# Patient Record
Sex: MALE | Race: WHITE | NOT HISPANIC OR LATINO | Employment: FULL TIME | URBAN - METROPOLITAN AREA
[De-identification: names, ages, dates, MRNs, and addresses within clinical notes are randomized per-mention and may not be internally consistent; named-entity substitution may affect disease eponyms.]

---

## 2009-01-22 LAB — HCV AB SER-ACNC: NEGATIVE

## 2017-11-13 ENCOUNTER — OFFICE VISIT (OUTPATIENT)
Dept: URGENT CARE | Facility: CLINIC | Age: 42
End: 2017-11-13
Payer: COMMERCIAL

## 2017-11-13 PROCEDURE — G0382 LEV 3 HOSP TYPE B ED VISIT: HCPCS

## 2017-11-13 PROCEDURE — S9083 URGENT CARE CENTER GLOBAL: HCPCS

## 2017-11-14 NOTE — PROGRESS NOTES
Assessment    1  Acute bronchitis (466 0) (J20 9)    Plan  Acute bronchitis    · Azithromycin 250 MG Oral Tablet (Zithromax Z-Robert); TAKE 2 TABLETS ON DAY 1THEN TAKE 1 TABLET A DAY FOR 4 DAYS   · Proventil  (90 Base) MCG/ACT Inhalation Aerosol Solution; INHALE 1-2PUFFS EVERY 4-6 HOURS AS NEEDED AND AS DIRECTED    Discussion/Summary  Discussion Summary:   Take antibiotic as directed  (Z-Robert)  use Proventil inhaler as needed for wheezing  Increase fluids  Continue with Mucinex  Follow-up family doctor if symptoms persist or worsen over the next 3-4 days  Medication Side Effects Reviewed: Possible side effects of new medications were reviewed with the patient/guardian today  Understands and agrees with treatment plan: The treatment plan was reviewed with the patient/guardian  The patient/guardian understands and agrees with the treatment plan   Follow Up Instructions: Follow Up with your Primary Care Provider in 3-7-4 days  If your symptoms worsen, go to the nearest [place] [address] Emergency Department  Chief Complaint    1  Cold Symptoms  Chief Complaint Free Text Note Form: Patient relates started with cough, congestion, and sinus symptoms for 2 days  Denies fever  History of Present Illness  HPI: Patient presents today with complaints of cough and cold symptoms over the last 2 days  He states the cough is getting worse now productive for yellow brown mucus  He admits to feeling little tightness chest with coughing  He denies any associated fevers  He is a recovering heroin addict  He denies any history of asthma  He is a smoker but only smoking a few cigarettes a day at present  Hospital Based Practices Required Assessment:  Abuse And Domestic Violence Screen   Yes, the patient is safe at home  -- The patient states no one is hurting them  Depression And Suicide Screen  No, the patient has not had thoughts of hurting themself  No, the patient has not felt depressed in the past 7 days  Prefered Language is  english  Primary Language is  english  Review of Systems  Focused-Male:  Constitutional: no fever or chills, feels well, no tiredness, no recent weight loss or weight gain  ENT: no complaints of earache, no loss of hearing, no nosebleeds or nasal discharge, no sore throat or hoarseness  Cardiovascular: no complaints of slow or fast heart rate, no chest pain, no palpitations, no leg claudication or lower extremity edema  Respiratory: cough-- and-- wheezing  Gastrointestinal: no complaints of abdominal pain, no constipation, no nausea or vomiting, no diarrhea or bloody stools  Genitourinary: no complaints of dysuria or incontinence, no hesitancy, no nocturia, no genital lesion, no inadequacy of penile erection  Musculoskeletal: no complaints of arthralgia, no myalgia, no joint swelling or stiffness, no limb pain or swelling  Integumentary: no rashes  Active Problems  1  Acute sinusitis (461 9) (J01 90)   2  Cough (786 2) (R05)    Past Medical History  1  History of heroin use (V11 8) (Z86 59)  Active Problems And Past Medical History Reviewed: The active problems and past medical history were reviewed and updated today  Family History  Family History Reviewed: The family history was reviewed and updated today  Social History   · Current every day smoker (305 1) (F17 200)  Social History Reviewed: The social history was reviewed and updated today  Surgical History    1  Denied: History Of Prior Surgery  Surgical History Reviewed: The surgical history was reviewed and updated today  Current Meds   1  Methadone HCl - 10 MG/5ML Oral Solution; Therapy: (Recorded:[id number]) to Recorded  Medication List Reviewed: The medication list was reviewed and updated today  Allergies    1   No Known Drug Allergies    Vitals  Signs   Recorded: [id number] 06:30PM   Temperature: 99 6 F, Tympanic  Heart Rate: 89  Respiration: 18  Systolic: 715, LUE, Sitting  Diastolic: 82, LUE, Sitting  Height: 5 ft 6 in  Weight: 248 lb 6 oz  BMI Calculated: 40 09  BSA Calculated: 2 19  O2 Saturation: 97, RA  Pain Scale: 0    Physical Exam   Constitutional  General appearance: No acute distress, well appearing and well nourished  Eyes  Conjunctiva and lids: No swelling, erythema, or discharge  Pupils and irises: Equal, round and reactive to light  Ears, Nose, Mouth, and Throat  External inspection of ears and nose: Normal    Otoscopic examination: Tympanic membrance translucent with normal light reflex  Canals patent without erythema  Nasal mucosa, septum, and turbinates: Normal without edema or erythema  Oropharynx: Normal with no erythema, edema, exudate or lesions  Pulmonary  Respiratory effort: No increased work of breathing or signs of respiratory distress  Auscultation of lungs: Abnormal  -- Coarse breath sounds with reactive cough  End expiratory wheeze  No costal retractions  Cardiovascular  Palpation of heart: Normal PMI, no thrills  Auscultation of heart: Normal rate and rhythm, normal S1 and S2, without murmurs  Abdomen  Abdomen: Non-tender, no masses  Message  Return to work or school:   Marcia Dubin is under my professional care  He was seen in my office on 11/13/2017   He is able to return to work on  11/14/2007      ANCELMO Stafford        Signatures   Electronically signed by : Rose Marie Croft DO; Nov 13 2017  6:53PM EST                       (Author)

## 2018-01-18 NOTE — MISCELLANEOUS
Message  Return to work or school:   Trae Clark is under my professional care  He was seen in my office on 11/13/2017   He is able to return to work on  11/14/2007       ANCELMO Navarro        Signatures   Electronically signed by : Scottie Davis DO; Nov 13 2017  6:53PM EST                       (Author)

## 2018-02-21 ENCOUNTER — OFFICE VISIT (OUTPATIENT)
Dept: URGENT CARE | Facility: CLINIC | Age: 43
End: 2018-02-21
Payer: COMMERCIAL

## 2018-02-21 VITALS
WEIGHT: 250 LBS | RESPIRATION RATE: 20 BRPM | OXYGEN SATURATION: 96 % | HEART RATE: 98 BPM | BODY MASS INDEX: 40.18 KG/M2 | DIASTOLIC BLOOD PRESSURE: 70 MMHG | HEIGHT: 66 IN | SYSTOLIC BLOOD PRESSURE: 140 MMHG | TEMPERATURE: 98.1 F

## 2018-02-21 DIAGNOSIS — J01.90 ACUTE SINUSITIS, RECURRENCE NOT SPECIFIED, UNSPECIFIED LOCATION: Primary | ICD-10-CM

## 2018-02-21 PROCEDURE — 99203 OFFICE O/P NEW LOW 30 MIN: CPT | Performed by: FAMILY MEDICINE

## 2018-02-21 RX ORDER — FLUTICASONE PROPIONATE 50 MCG
2 SPRAY, SUSPENSION (ML) NASAL DAILY
Qty: 16 G | Refills: 0 | Status: SHIPPED | OUTPATIENT
Start: 2018-02-21 | End: 2018-05-01 | Stop reason: HOSPADM

## 2018-02-21 NOTE — LETTER
February 21, 2018     Patient: Jagdeep William   YOB: 1975   Date of Visit: 2/21/2018       To Whom It May Concern: It is my medical opinion that Jagdeep William may return to work on 02/22/2008  If you have any questions or concerns, please don't hesitate to call           Sincerely,        Iliana Cali DO    CC: No Recipients

## 2018-02-22 NOTE — PATIENT INSTRUCTIONS
Take Flonase nasal spray as directed  Mucinex over-the-counter as needed for symptoms  Follow with your family doctor in 3-4 days if symptoms persist or worsen

## 2018-05-01 ENCOUNTER — OFFICE VISIT (OUTPATIENT)
Dept: URGENT CARE | Facility: CLINIC | Age: 43
End: 2018-05-01

## 2018-05-01 VITALS
BODY MASS INDEX: 42.38 KG/M2 | HEART RATE: 110 BPM | DIASTOLIC BLOOD PRESSURE: 94 MMHG | TEMPERATURE: 97.5 F | OXYGEN SATURATION: 96 % | HEIGHT: 67 IN | WEIGHT: 270 LBS | SYSTOLIC BLOOD PRESSURE: 130 MMHG

## 2018-05-01 DIAGNOSIS — I87.2 VENOUS INSUFFICIENCY: Primary | ICD-10-CM

## 2018-05-01 PROCEDURE — 99203 OFFICE O/P NEW LOW 30 MIN: CPT | Performed by: PHYSICIAN ASSISTANT

## 2018-05-01 RX ORDER — METHADONE HYDROCHLORIDE 10 MG/1
TABLET ORAL EVERY 6 HOURS PRN
COMMUNITY

## 2018-05-01 NOTE — LETTER
May 1, 2018     Patient: Pilar Bosch   YOB: 1975   Date of Visit: 5/1/2018       To Whom It May Concern: It is my medical opinion that Pilar Bosch may return to work on 05/02/2018  If you have any questions or concerns, please don't hesitate to call           Sincerely,        Pat Arvizu PA-C    CC: No Recipients

## 2018-05-01 NOTE — LETTER
May 1, 2018     Patient: Silvio Garrett   YOB: 1975   Date of Visit: 5/1/2018       To Whom It May Concern: It is my medical opinion that Silvio Garrett may return to work on 05/03/2018  If you have any questions or concerns, please don't hesitate to call           Sincerely,        Sofie Raymond PA-C    CC: No Recipients

## 2018-05-12 NOTE — PROGRESS NOTES
[facility]        NAME: Paty Cazares is a 43 y o  male  : 1975    MRN: [id number]  DATE: May 12, 2018  TIME: 10:45 AM    Assessment and Plan   Venous insufficiency [I87 2]  1  Venous insufficiency       Patient Instructions     Compression socks and elevate feet when possible  Follow up with PCP in 3-5 days  Proceed to  ER if symptoms worsen  Chief Complaint     Chief Complaint   Patient presents with    Leg Swelling     x3 months symptoms worsening as today calves were bright red and bruning  History of Present Illness     35yo M presents for evaluation of worsening bilateral lower leg swelling and redness  Patient states he has had this for several months but it has greatly worsened in last 2 days after mowing grass in the heat  Patient states bilateral lower legs are painful and describes pain as an ache 5/10 in intensity  Patient denies hx of blood clots or strokes, chest pain, weakness, numbness, tingling  Review of Systems   Review of Systems   Constitutional: Negative for activity change, appetite change, chills, diaphoresis, fatigue, fever and unexpected weight change  Respiratory: Negative for apnea, cough, choking, chest tightness, shortness of breath, wheezing and stridor  Cardiovascular: Positive for leg swelling  Negative for chest pain and palpitations  Skin: Positive for color change and rash  Negative for pallor and wound  Current Medications       Current Outpatient Prescriptions:     methadone (DOLOPHINE) 10 mg tablet, Take by mouth every 6 (six) hours as needed for moderate pain,, Disp: , Rfl:     Current Allergies     Allergies as of 2018    (No Known Allergies)            The following portions of the patient's history were reviewed and updated as appropriate: allergies, current medications, past family history, past medical history, past social history, past surgical history and problem list      History reviewed   No pertinent past medical history  History reviewed  No pertinent surgical history  Family History   Problem Relation Age of Onset    Family history unknown: Yes         Medications have been verified  Objective   /94   Pulse (!) 110   Temp 97 5 °F (36 4 °C) (Tympanic)   Ht 5' 7" (1 702 m)   Wt 122 kg (270 lb)   SpO2 96%   BMI 42 29 kg/m²        Physical Exam     Physical Exam   Constitutional: He appears well-developed and well-nourished  Cardiovascular: Normal rate, regular rhythm, normal heart sounds and intact distal pulses  Exam reveals no gallop and no friction rub  No murmur heard  Pulmonary/Chest: Effort normal and breath sounds normal  No respiratory distress  He has no wheezes  He has no rales  He exhibits no tenderness  Abdominal: Soft  Bowel sounds are normal  He exhibits no distension and no mass  There is no tenderness  There is no rebound and no guarding     Skin:   Severe venous stasis rash of bilateral lower extremities below level of knee; severe varicose veins in bilateral legs; negative Kings's sign; no ecchymosis or warmth to touch of bilateral lower legs

## 2022-04-14 ENCOUNTER — APPOINTMENT (OUTPATIENT)
Dept: RADIOLOGY | Facility: CLINIC | Age: 47
End: 2022-04-14
Payer: COMMERCIAL

## 2022-04-14 ENCOUNTER — OFFICE VISIT (OUTPATIENT)
Dept: URGENT CARE | Facility: CLINIC | Age: 47
End: 2022-04-14
Payer: COMMERCIAL

## 2022-04-14 VITALS
HEIGHT: 67 IN | RESPIRATION RATE: 18 BRPM | OXYGEN SATURATION: 100 % | BODY MASS INDEX: 40.81 KG/M2 | DIASTOLIC BLOOD PRESSURE: 83 MMHG | SYSTOLIC BLOOD PRESSURE: 135 MMHG | TEMPERATURE: 98.7 F | WEIGHT: 260 LBS | HEART RATE: 92 BPM

## 2022-04-14 DIAGNOSIS — R06.00 DYSPNEA ON EXERTION: ICD-10-CM

## 2022-04-14 DIAGNOSIS — R10.84 GENERALIZED ABDOMINAL PAIN: Primary | ICD-10-CM

## 2022-04-14 DIAGNOSIS — R42 DIZZINESS: ICD-10-CM

## 2022-04-14 DIAGNOSIS — K02.9 DENTAL CARIES: ICD-10-CM

## 2022-04-14 LAB — GLUCOSE SERPL-MCNC: 125 MG/DL (ref 65–140)

## 2022-04-14 PROCEDURE — 71046 X-RAY EXAM CHEST 2 VIEWS: CPT

## 2022-04-14 PROCEDURE — 99213 OFFICE O/P EST LOW 20 MIN: CPT | Performed by: PHYSICIAN ASSISTANT

## 2022-04-14 PROCEDURE — 82948 REAGENT STRIP/BLOOD GLUCOSE: CPT | Performed by: PHYSICIAN ASSISTANT

## 2022-04-14 NOTE — PATIENT INSTRUCTIONS
Consider over-the-counter reflux medications  Drink plenty of fluids  Make appoint with family doctor and the dentist   Lisa Cota direct to the ER began having increased dizziness, change in vision, slurred speech, confusion, unilateral weakness, chest pain, shortness of breath, or severe abdominal pain  GERD (Gastroesophageal Reflux Disease)   WHAT YOU NEED TO KNOW:   Gastroesophageal reflux disease (GERD) is reflux that happens more than 2 times a week for a few weeks  Reflux means acid and food in your stomach back up into your esophagus  GERD can cause other health problems over time if it is not treated  DISCHARGE INSTRUCTIONS:   Call your local emergency number (911 in the [address]) if:   · You have severe chest pain and sudden trouble breathing  Return to the emergency department if:   · You have trouble breathing after you vomit  · You have trouble swallowing, or pain with swallowing  · Your bowel movements are black, bloody, or tarry-looking  · Your vomit looks like coffee grounds or has blood in it  Call your doctor or gastroenterologist if:   · You feel full and cannot burp or vomit  · You vomit large amounts, or you vomit often  · You are losing weight without trying  · Your symptoms get worse or do not improve with treatment  · You have questions or concerns about your condition or care  Medicines:   · Medicines  are used to decrease stomach acid  Medicine may also be used to help your lower esophageal sphincter and stomach contract (tighten) more  · Take your medicine as directed  Contact your healthcare provider if you think your medicine is not helping or if you have side effects  Tell him of her if you are allergic to any medicine  Keep a list of the medicines, vitamins, and herbs you take  Include the amounts, and when and why you take them  Bring the list or the pill bottles to follow-up visits  Carry your medicine list with you in case of an emergency      Manage GERD:       · Do not have foods or drinks that may increase heartburn  These include chocolate, peppermint, fried or fatty foods, drinks that contain caffeine, or carbonated drinks (soda)  Other foods include spicy foods, onions, tomatoes, and tomato-based foods  Do not have foods or drinks that can irritate your esophagus, such as citrus fruits, juices, and alcohol  · Do not eat large meals  When you eat a lot of food at one time, your stomach needs more acid to digest it  Eat 6 small meals each day instead of 3 large ones, and eat slowly  Do not eat meals 2 to 3 hours before bedtime  · Elevate the head of your bed  Place 6-inch blocks under the head of your bed frame  You may also use more than one pillow under your head and shoulders while you sleep  · Maintain a healthy weight  If you are overweight, weight loss may help relieve symptoms of GERD  · Do not smoke  Smoking weakens the lower esophageal sphincter and increases the risk of GERD  Ask your healthcare provider for information if you currently smoke and need help to quit  E-cigarettes or smokeless tobacco still contain nicotine  Talk to your healthcare provider before you use these products  · Do not put pressure on your abdomen  Pressure pushes acid up into your esophagus  Do not wear clothing that is tight around your waist  Do not bend over  Bend at the knees if you need to pick something up  Follow up with your doctor or gastroenterologist as directed:  Write down your questions so you remember to ask them during your visits  © [employer] 2022 Information is for End User's use only and may not be sold, redistributed or otherwise used for commercial purposes  All illustrations and images included in CareNotes® are the copyrighted property of [employer]  or [facility] Angelica Pereira   The above information is an  only  It is not intended as medical advice for individual conditions or treatments   Talk to your doctor, nurse or pharmacist before following any medical regimen to see if it is safe and effective for you  Abdominal Pain   WHAT YOU NEED TO KNOW:   Abdominal pain can be dull, achy, or sharp  You may have pain in one area of your abdomen, or in your entire abdomen  Your pain may be caused by a condition such as constipation, food sensitivity or poisoning, infection, or a blockage  Abdominal pain can also be from a hernia, appendicitis, or an ulcer  Liver, gallbladder, or kidney conditions can also cause abdominal pain  The cause of your abdominal pain may not be known  DISCHARGE INSTRUCTIONS:   Call your local emergency number (911 in the [address]) if:   · You have new chest pain or shortness of breath  Return to the emergency department if:   · You have pulsing pain in your upper abdomen or lower back that suddenly becomes constant  · Your pain is in the right lower abdominal area and worsens with movement  · You have a fever over 100 4°F (38°C) or shaking chills  · You are vomiting and cannot keep food or liquids down  · Your pain does not improve or gets worse over the next 8 to 12 hours  · You see blood in your vomit or bowel movements, or they look black and tarry  · Your skin or the whites of your eyes turn yellow  · You are a woman and have a large amount of vaginal bleeding that is not your monthly period  Call your doctor if:   · You have pain in your lower back  · You are a man and have pain in your testicles  · You have pain when you urinate  · You have questions or concerns about your condition or care  Medicines:   · Prescription pain medicine  may be given  Ask your healthcare provider how to take this medicine safely  Some prescription pain medicines contain acetaminophen  Do not take other medicines that contain acetaminophen without talking to your healthcare provider  Too much acetaminophen may cause liver damage   Prescription pain medicine may cause constipation  Ask your healthcare provider how to prevent or treat constipation  · Medicines  may be given to calm your stomach or prevent vomiting  · Take your medicine as directed  Contact your healthcare provider if you think your medicine is not helping or if you have side effects  Tell him of her if you are allergic to any medicine  Keep a list of the medicines, vitamins, and herbs you take  Include the amounts, and when and why you take them  Bring the list or the pill bottles to follow-up visits  Carry your medicine list with you in case of an emergency  Manage your symptoms:   · Apply heat  on your abdomen for 20 to 30 minutes every 2 hours for as many days as directed  Heat helps decrease pain and muscle spasms  · Make changes to the food you eat, if needed  Do not eat foods that cause abdominal pain or other symptoms  Eat small meals more often  The following changes may also help:    ? Eat more high-fiber foods if you are constipated  High-fiber foods include fruits, vegetables, whole-grain foods, and legumes  ? Do not eat foods that cause gas if you have bloating  Examples include broccoli, cabbage, and cauliflower  Do not drink soda or carbonated drinks  These may also cause gas  ? Do not eat foods or drinks that contain sorbitol or fructose if you have diarrhea and bloating  Some examples are fruit juices, candy, jelly, and sugar-free gum  ? Do not eat high-fat foods  Examples include fried foods, cheeseburgers, hot dogs, and desserts  ? Limit or do not have caffeine  Caffeine may make symptoms such as heartburn or nausea worse  ? Drink more liquids to prevent dehydration from diarrhea or vomiting  Ask your healthcare provider how much liquid to drink each day and which liquids are best for you  · Keep a diary of your abdominal pain  A diary may help your healthcare provider learn what is causing your abdominal pain   Include when the pain happens, how long it lasts, and what the pain feels like  Write down any other symptoms you have with abdominal pain  Also write down what you eat, and what symptoms you have after you eat  · Manage your stress  Stress may cause abdominal pain  Your healthcare provider may recommend relaxation techniques and deep breathing exercises to help decrease your stress  Your healthcare provider may recommend you talk to someone about your stress or anxiety, such as a counselor or a trusted friend  Get plenty of sleep and exercise regularly  · Limit or do not drink alcohol  Alcohol can make your abdominal pain worse  Ask your healthcare provider if it is safe for you to drink alcohol  Also ask how much is safe for you to drink  · Do not smoke  Nicotine and other chemicals in cigarettes can damage your esophagus and stomach  Ask your healthcare provider for information if you currently smoke and need help to quit  E-cigarettes or smokeless tobacco still contain nicotine  Talk to your healthcare provider before you use these products  Follow up with your doctor within 24 hours or as directed:  Write down your questions so you remember to ask them during your visits  © Copyright [employer] 2022 Information is for End User's use only and may not be sold, redistributed or otherwise used for commercial purposes  All illustrations and images included in CareNotes® are the copyrighted property of [employer]  or [address] [place] [address]  The above information is an  only  It is not intended as medical advice for individual conditions or treatments  Talk to your doctor, nurse or pharmacist before following any medical regimen to see if it is safe and effective for you  Dizziness   WHAT YOU NEED TO KNOW:   Dizziness is a feeling of being off balance or unsteady  Common causes of dizziness are an inner ear fluid imbalance or a lack of oxygen in your blood   Dizziness may be acute (lasts 3 days or less) or chronic (lasts longer than 3 days)  You may have dizzy spells that last from seconds to a few hours  DISCHARGE INSTRUCTIONS:   Return to the emergency department if:   · You have a headache and a stiff neck  · You have shaking chills and a fever  · You vomit over and over with no relief  · Your vomit or bowel movements are red or black  · You have pain in your chest, back, or abdomen  · You have numbness, especially in your face, arms, or legs  · You have trouble moving your arms or legs  · You are confused  Contact your healthcare provider if:   · You have a fever  · Your symptoms do not get better with treatment  · You have questions or concerns about your condition or care  Manage your symptoms:   · Do not drive  or operate heavy machinery when you are dizzy  · Get up slowly  from sitting or lying down  · Drink plenty of liquids  Liquids help prevent dehydration  Ask how much liquid to drink each day and which liquids are best for you  Follow up with your doctor as directed:  Write down your questions so you remember to ask them during your visits  © Copyright [employer] 2022 Information is for End User's use only and may not be sold, redistributed or otherwise used for commercial purposes  All illustrations and images included in CareNotes® are the copyrighted property of A [employer] A M [employer] Inc  or [facility] Angelica Pereira   The above information is an  only  It is not intended as medical advice for individual conditions or treatments  Talk to your doctor, nurse or pharmacist before following any medical regimen to see if it is safe and effective for you

## 2022-04-14 NOTE — LETTER
April 14, 2022     Patient: Tarik Madrid   YOB: 1975   Date of Visit: 4/14/2022       To Whom It May Concern: It is my medical opinion that Tarik Madrid should remain out of work until 4/18/2022           Sincerely,        Bradley Menon PA-C

## 2022-04-14 NOTE — PROGRESS NOTES
[facility]        NAME: Diana Springer is a 55 y o  male  : 1975    MRN: [id number]  DATE: 2022  TIME: 10:18 AM    Assessment and Plan   Generalized abdominal pain [R10 84]  1  Generalized abdominal pain  Ambulatory referral to [facility]   2  Dizziness  Fingerstick Glucose (POCT)   3  Dyspnea on exertion  XR chest pa & lateral   4  Dental caries  Ambulatory Referral to Dentistry     Suspect abdominal pain and upset stomach is possible reflux  Advised trial of over-the-counter reflux medications  However, history of fatty liver  Patient needs additional evaluation workup by family doctor  Patient given referral for Family Practice next door and local dentists  Patient Instructions   Consider over-the-counter reflux medications  Drink plenty of fluids  Make appoint with family doctor and the dentist   Lexy Castorena direct to the ER began having increased dizziness, change in vision, slurred speech, confusion, unilateral weakness, chest pain, shortness of breath, or severe abdominal pain  Follow up with PCP in 3-5 days  Proceed to  ER if symptoms worsen  Chief Complaint     Chief Complaint   Patient presents with    Abdominal Pain     Going on about a month with center belly pain and nausea feeling and having some dizziness  that started last night at work          History of Present Illness       Patient is a 40-year-old male with significant 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 presents the office complaining of multiple complaints  Patient reports he has had generalized abdominal cramping for 1 month  States eating appears to make it worse  Initially thought his symptoms was from a poor diet but has changed his diet and continues to have symptoms  Admits he also has symptoms even when he is not eating but thought it was just because he was hungry pains however, pain would continue even after he ate   When pain occurs, it is described as 5/10 "upset stomach"  Patient states he has generalized abdominal discomfort and bloating at this time  No change in bowel movements  History of fatty liver  States he constantly has a bad taste in his mouth denies throwing up in his mouth or belching  Admits he has very poor dentition and needs many teeth pulled  No current current dental pain  He went to the dentist 6 months ago for infection  Yesterday patient began having some brief dizzy spells which lasts a few seconds  States he feels lightheaded but denies syncopal episodes, floaters, chest pain, slurred speech, unilateral weakness, or confusion  Dizziness occurs both at rest and on exertion  Denies any recent illness or head trauma  He also notes having some dyspnea on exertion with associated palpitations described as heart racing over last few days  States he is generally active as he works in a [employer]  Patient has not seen a family doctor in 11 years  Review of Systems   Review of Systems   Constitutional: Positive for fatigue  Negative for chills and fever  HENT: Negative for congestion, postnasal drip, rhinorrhea and sore throat  Eyes: Negative for visual disturbance  Respiratory: Positive for shortness of breath  Negative for cough and wheezing  Cardiovascular: Positive for palpitations  Negative for chest pain and leg swelling  Gastrointestinal: Negative for abdominal pain, diarrhea, nausea and vomiting  Genitourinary: Negative for frequency  Musculoskeletal: Negative for myalgias  Skin: Negative for rash  Neurological: Positive for dizziness and light-headedness  Negative for syncope, facial asymmetry, weakness, numbness and headaches           Current Medications       Current Outpatient Medications:     methadone (DOLOPHINE) 10 mg tablet, Take by mouth every 6 (six) hours as needed for moderate pain, (Patient not taking: Reported on 4/14/2022, ), Disp: , Rfl:     Current Allergies     Allergies as of 04/14/2022    (No Known Allergies)            The following portions of the patient's history were reviewed and updated as appropriate: allergies, current medications, past family history, past medical history, past social history, past surgical history and problem list      Past Medical History:   Diagnosis Date    No known problems        Past Surgical History:   Procedure Laterality Date    NO PAST SURGERIES         Family History   Problem Relation Age of Onset    Diabetes Mother     Heart attack Father          Medications have been verified  Objective   /83   Pulse 92   Temp 98 7 °F (37 1 °C)   Resp 18   Ht 5' 7" (1 702 m)   Wt 118 kg (260 lb)   SpO2 100%   BMI 40 72 kg/m²   No LMP for male patient  Physical Exam     Physical Exam  Vitals and nursing note reviewed  Constitutional:       Appearance: Normal appearance  He is well-developed  He is obese  He is not ill-appearing  HENT:      Head: Normocephalic and atraumatic  Right Ear: Tympanic membrane, ear canal and external ear normal       Left Ear: Tympanic membrane, ear canal and external ear normal       Nose: Nose normal       Mouth/Throat:      Dentition: Abnormal dentition  Gingival swelling and dental caries present  No dental abscesses  Pharynx: Uvula midline  Comments: Diffuse poor oral with multiple caries and missing teeth  Eyes:      General: Lids are normal       Extraocular Movements: Extraocular movements intact  Conjunctiva/sclera: Conjunctivae normal       Pupils: Pupils are equal, round, and reactive to light  Cardiovascular:      Rate and Rhythm: Normal rate and regular rhythm  Heart sounds: Normal heart sounds  No murmur heard  No friction rub  No gallop  Pulmonary:      Effort: Pulmonary effort is normal       Breath sounds: Normal breath sounds  No stridor  No wheezing or rales     Abdominal:      General: Bowel sounds are normal  Palpations: Abdomen is soft  Tenderness: There is no abdominal tenderness  There is no guarding or rebound  Musculoskeletal:         General: Normal range of motion  Cervical back: Neck supple  Skin:     General: Skin is warm and dry  Capillary Refill: Capillary refill takes less than 2 seconds  Comments: 1+ LE pitting edema b/l   Neurological:      General: No focal deficit present  Mental Status: He is alert  GCS: GCS eye subscore is 4  GCS verbal subscore is 5  GCS motor subscore is 6  Cranial Nerves: Cranial nerves are intact  Sensory: Sensation is intact  Motor: Motor function is intact  Coordination: Coordination is intact  Gait: Gait is intact  Chest x-ray:  No evidence of acute cardiopulmonary etiology  Radiology interpretation pending  Point of care glucose: 125   Not fasting

## 2022-05-16 ENCOUNTER — OFFICE VISIT (OUTPATIENT)
Dept: URGENT CARE | Facility: CLINIC | Age: 47
End: 2022-05-16
Payer: COMMERCIAL

## 2022-05-16 VITALS
TEMPERATURE: 97 F | WEIGHT: 280 LBS | OXYGEN SATURATION: 96 % | HEIGHT: 67 IN | BODY MASS INDEX: 43.95 KG/M2 | HEART RATE: 86 BPM | RESPIRATION RATE: 18 BRPM

## 2022-05-16 DIAGNOSIS — R68.89 FLU-LIKE SYMPTOMS: Primary | ICD-10-CM

## 2022-05-16 PROCEDURE — 99213 OFFICE O/P EST LOW 20 MIN: CPT | Performed by: EMERGENCY MEDICINE

## 2022-05-16 PROCEDURE — U0005 INFEC AGEN DETEC AMPLI PROBE: HCPCS | Performed by: EMERGENCY MEDICINE

## 2022-05-16 PROCEDURE — U0003 INFECTIOUS AGENT DETECTION BY NUCLEIC ACID (DNA OR RNA); SEVERE ACUTE RESPIRATORY SYNDROME CORONAVIRUS 2 (SARS-COV-2) (CORONAVIRUS DISEASE [COVID-19]), AMPLIFIED PROBE TECHNIQUE, MAKING USE OF HIGH THROUGHPUT TECHNOLOGIES AS DESCRIBED BY CMS-2020-01-R: HCPCS | Performed by: EMERGENCY MEDICINE

## 2022-05-16 NOTE — PATIENT INSTRUCTIONS
You have been diagnosed with a flu-like illness, and your symptoms should resolve over the next 7 to 10 days with the treatments recommended today  If they do not, it is possible that you have developed a bacterial infection and you should return  If you were to take an antibiotic while you are still in the viral stage, you will not get better any faster, but could kill off good germs in your body as well as make germs resistant to the antibiotic  Take an expectorant - guaifenesin should be the only ingredient - during the day, and the cough suppressant (ex  Robitussin DM or Tessalon) if needed at night only  Take Zinc 50 mg every 12 hours for the next week  You should also take Quercetin 500 mg twice daily with it  You should also take vitamin D3 5000 i u s per day for the next 1 week, and vitamin-C 1 g daily  May take Flonase as discussed  You may also take a decongestant like Sudafed, unless you have hypertension or cardiac disease  You may take Imodium for diarrhea according to package instructions  Today you were tested for COVID-19 and influenza  Results will return approximately 5 days  The fastest way to receive results is to download the [facility] MyChart tyrone on your phone or great account on a computer  If you view your results on [employer] we will not call you  If you do not see results on [employer] we will call you if your positive or negative  WE CANNOT PRINT YOUR TEST RESULTS FROM THE URGENT CARE  This is against a law called HIPAA  You may print results from your [employer] (IT help [phone] option 5) or call medical records at [phone]  Prophylactically self quarantine  Department of health's newest recommendations as of December 27,2021 state the following:     IF you TEST POSITIVE for COVID-19  -stay home for 5 days  If you have no symptoms or your symptoms are resolving after 5 days, you can leave your house   Continue to wear a mask around others for 5 additional days  If you have a fever, continue to stay home until you fever resolves  IF YOU WERE EXPOSED TO SOMEONE WITH COVID 19  -if you have been boosted OR completed the primary series of Pfizer or Moderna vaccine within the last 6 months OR completed the primary series of J&J vaccine within the last 2 months, wear a mask around others for 10 days  Get tested on day 5 if possible  If you develop symptoms, get a test and stay home      -if you completed the primary series of Pfizer or Moderna vaccine over 6 months ago and are NOT boosted OR completed the primary series of J&J over 2 months ago and are NOT boosted OR are unvaccinated, stay home for 5 days  After that continue to wear a mask around others for 5 additional days  If you can not quarantine you must wear a mask for 10 days  Test on day 5 if possible  If you develops symptoms get a test and stay home  Drink lots of fluids to maintain hydration  Do not touch your face, wash hands often, and practice social distancing  There is no treatment for simple outpatient COVID-19 patients however, CDC recommends 2000 units vitamin D3 to boost the immune system  Those with severe illness, older age, or multiple comorbidities may qualify for monoclonal antibody infusions as treatment  Please call your doctor to see if you qualify  Call your family doctor to have a follow-up appointment in next few days  Go to ER if he began experiencing chest pain, shortness of breath, fever that is not responding to antipyretics or other severe symptoms  Follow up with PCP in 3-5 days  Proceed to ER if symptoms worsen  Flu-like illness   AMBULATORY CARE:   Flu-like illness is an infection caused by a virus  The flu is easily spread when an infected person coughs, sneezes, or has close contact with others  You may be able to spread the flu to others for 1 week or longer after signs or symptoms appear     Common signs and symptoms include the following:   Fever and chills    Headaches, body aches, and muscle or joint pain    Cough, runny nose, and sore throat    Loss of appetite, nausea, vomiting, or diarrhea    Tiredness    Trouble breathing  Call 911 for any of the following: You have trouble breathing, and your lips look purple or blue  You have a seizure  Seek care immediately if:   You are dizzy, or you are urinating less or not at all  You have a headache with a stiff neck, and you feel tired or confused  You have new pain or pressure in your chest     Your symptoms, such as shortness of breath, vomiting, or diarrhea, get worse  Your symptoms, such as fever and coughing, seem to get better, but then get worse  Contact your healthcare provider if:   You have new muscle pain or weakness  You have questions or concerns about your condition or care  Treatment for influenza  may include any of the following:  Acetaminophen decreases pain and fever  It is available without a doctor's order  Ask how much to take and how often to take it  Follow directions  Acetaminophen can cause liver damage if not taken correctly  NSAIDs  such as ibuprofen, help decrease swelling, pain, and fever  This medicine is available with or without a doctor's order  NSAIDs can cause stomach bleeding or kidney problems in certain people  If you take blood thinner medicine, always ask your healthcare provider if NSAIDs are safe for you  Always read the medicine label and follow directions  Antivirals  help fight a viral infection  Manage your symptoms:   Rest  as much as you can to help you recover  Drink liquids as directed  to help prevent dehydration  Ask how much liquid to drink each day and which liquids are best for you  Prevent the spread of the flu: Wash your hands often  Use soap and water  Wash your hands after you use the bathroom, change a child's diapers, or sneeze  Wash your hands before you prepare or eat food   Use gel hand cleanser when soap and water are not available  Do not touch your eyes, nose, or mouth unless you have washed your hands first        Cover your mouth when you sneeze or cough  Cough into a tissue or the bend of your arm  Clean shared items with a germ-killing   Clean table surfaces, doorknobs, and light switches  Do not share towels, silverware, and dishes with people who are sick  Wash bed sheets, towels, silverware, and dishes with soap and water  Wear a mask  over your mouth and nose if you are sick or are near anyone who is sick  Stay away from others  if you are sick  Influenza vaccine  helps prevent influenza (flu)  Everyone older than 6 months should get a yearly influenza vaccine  Get the vaccine as soon as it is available, usually in September or October each year  Follow up with your healthcare provider as directed:  Write down your questions so you remember to ask them during your visits  © 2017 [address] Information is for End User's use only and may not be sold, redistributed or otherwise used for commercial purposes  All illustrations and images included in CareNotes® are the copyrighted property of [employer]  or Pepe Dutton  The above information is an  only  It is not intended as medical advice for individual conditions or treatments  Talk to your doctor, nurse or pharmacist before following any medical regimen to see if it is safe and effective for you  [address]     Your healthcare provider and/or public health staff have evaluated you and have determined that you do not need to be hospitalized at this time  At this time you can be isolated at home where you will be monitored by staff from your local or state health department  You should carefully follow the prevention and isolation steps below until a healthcare provider or local or state health department says that you can return to your normal activities  Stay home except to get medical care     People who are mildly ill with COVID-19 are able to isolate at home during their illness  You should restrict activities outside your home, except for getting medical care  Do not go to work, school, or public areas  Avoid using public transportation, ride-sharing, or taxis  Separate yourself from other people and animals in your home     People: As much as possible, you should stay in a specific room and away from other people in your home  Also, you should use a separate bathroom, if available  Animals: You should restrict contact with pets and other animals while you are sick with COVID-19, just like you would around other people  Although there have not been reports of pets or other animals becoming sick with COVID-19, it is still recommended that people sick with COVID-19 limit contact with animals until more information is known about the virus  When possible, have another member of your household care for your animals while you are sick  If you are sick with COVID-19, avoid contact with your pet, including petting, snuggling, being kissed or licked, and sharing food  If you must care for your pet or be around animals while you are sick, wash your hands before and after you interact with pets and wear a facemask  See COVID-19 and Animals for more information  Call ahead before visiting your doctor     If you have a medical appointment, call the healthcare provider and tell them that you have or may have COVID-19  This will help the healthcare provider's office take steps to keep other people from getting infected or exposed  Wear a facemask     You should wear a facemask when you are around other people (e g , sharing a room or vehicle) or pets and before you enter a healthcare provider's office   If you are not able to wear a facemask (for example, because it causes trouble breathing), then people who live with you should not stay in the same room with you, or they should wear a facemask if they enter your room  Cover your coughs and sneezes     Cover your mouth and nose with a tissue when you cough or sneeze  Throw used tissues in a lined trash can  Immediately wash your hands with soap and water for at least 20 seconds or, if soap and water are not available, clean your hands with an alcohol-based hand  that contains at least 60% alcohol  Clean your hands often     Wash your hands often with soap and water for at least 20 seconds, especially after blowing your nose, coughing, or sneezing; going to the bathroom; and before eating or preparing food  If soap and water are not readily available, use an alcohol-based hand  with at least 60% alcohol, covering all surfaces of your hands and rubbing them together until they feel dry  Soap and water are the best option if hands are visibly dirty  Avoid touching your eyes, nose, and mouth with unwashed hands  Avoid sharing personal household items     You should not share dishes, drinking glasses, cups, eating utensils, towels, or bedding with other people or pets in your home  After using these items, they should be washed thoroughly with soap and water  Clean all high-touch surfaces everyday     High touch surfaces include counters, tabletops, doorknobs, bathroom fixtures, toilets, phones, keyboards, tablets, and bedside tables  Also, clean any surfaces that may have blood, stool, or body fluids on them  Use a household cleaning spray or wipe, according to the label instructions  Labels contain instructions for safe and effective use of the cleaning product including precautions you should take when applying the product, such as wearing gloves and making sure you have good ventilation during use of the product  Monitor your symptoms     Seek prompt medical attention if your illness is worsening (e g , difficulty breathing)   Before seeking care, call your healthcare provider and tell them that you have, or are being evaluated for, COVID-19  Put on a facemask before you enter the facility  These steps will help the healthcare provider's office to keep other people in the office or waiting room from getting infected or exposed  Ask your healthcare provider to call the local or state health department  Persons who are placed under active monitoring or facilitated self-monitoring should follow instructions provided by their local health department or occupational health professionals, as appropriate  If you have a medical emergency and need to call 911, notify the dispatch personnel that you have, or are being evaluated for COVID-19  If possible, put on a facemask before emergency medical services arrive  Discontinuing home isolation     Patients with confirmed COVID-19 should remain under home isolation precautions until the risk of secondary transmission to others is thought to be low  The decision to discontinue home isolation precautions should be made on a case-by-case basis, in consultation with healthcare providers and state and local health departments       Source: [employer]        Proceed to ER if symptoms worsen

## 2022-05-16 NOTE — PROGRESS NOTES
[facility]        NAME: Diana Springer is a 55 y o  male  : 1975    MRN: [id number]  DATE: May 16, 2022  TIME: 6:31 PM    Assessment and Plan   Flu-like symptoms [R68 89]  1  Flu-like symptoms  COVID Only -Office Collect         Patient Instructions     Patient Instructions     You have been diagnosed with a flu-like illness, and your symptoms should resolve over the next 7 to 10 days with the treatments recommended today  If they do not, it is possible that you have developed a bacterial infection and you should return  If you were to take an antibiotic while you are still in the viral stage, you will not get better any faster, but could kill off good germs in your body as well as make germs resistant to the antibiotic  Take an expectorant - guaifenesin should be the only ingredient - during the day, and the cough suppressant (ex  Robitussin DM or Tessalon) if needed at night only  Take Zinc 50 mg every 12 hours for the next week  You should also take Quercetin 500 mg twice daily with it  You should also take vitamin D3 5000 i u s per day for the next 1 week, and vitamin-C 1 g daily  May take Flonase as discussed  You may also take a decongestant like Sudafed, unless you have hypertension or cardiac disease  You may take Imodium for diarrhea according to package instructions  Today you were tested for COVID-19 and influenza  Results will return approximately 5 days  The fastest way to receive results is to download the [employer] [facility] tyrone on your phone or great account on a computer  If you view your results on [employer], we will not call you  If you do not see results on [employer], we will call you if your positive or negative  WE CANNOT PRINT YOUR TEST RESULTS FROM THE URGENT CARE  This is against a law called HIPAA  You may print results from your [employer] (IT help [phone] option 5) or call medical records at [phone]  Prophylactically self quarantine  Department of health's newest recommendations as of December 27,2021 state the following:     IF you TEST POSITIVE for COVID-19  -stay home for 5 days  If you have no symptoms or your symptoms are resolving after 5 days, you can leave your house  Continue to wear a mask around others for 5 additional days  If you have a fever, continue to stay home until you fever resolves  IF YOU WERE EXPOSED TO SOMEONE WITH COVID 19  -if you have been boosted OR completed the primary series of Pfizer or Moderna vaccine within the last 6 months OR completed the primary series of J&J vaccine within the last 2 months, wear a mask around others for 10 days  Get tested on day 5 if possible  If you develop symptoms, get a test and stay home      -if you completed the primary series of Pfizer or Moderna vaccine over 6 months ago and are NOT boosted OR completed the primary series of J&J over 2 months ago and are NOT boosted OR are unvaccinated, stay home for 5 days  After that continue to wear a mask around others for 5 additional days  If you can not quarantine you must wear a mask for 10 days  Test on day 5 if possible  If you develops symptoms get a test and stay home  Drink lots of fluids to maintain hydration  Do not touch your face, wash hands often, and practice social distancing  There is no treatment for simple outpatient COVID-19 patients however, CDC recommends 2000 units vitamin D3 to boost the immune system  Those with severe illness, older age, or multiple comorbidities may qualify for monoclonal antibody infusions as treatment  Please call your doctor to see if you qualify  Call your family doctor to have a follow-up appointment in next few days  Go to ER if he began experiencing chest pain, shortness of breath, fever that is not responding to antipyretics or other severe symptoms  Follow up with PCP in 3-5 days  Proceed to ER if symptoms worsen      Flu-like illness   AMBULATORY CARE:   Flu-like illness is an infection caused by a virus  The flu is easily spread when an infected person coughs, sneezes, or has close contact with others  You may be able to spread the flu to others for 1 week or longer after signs or symptoms appear  Common signs and symptoms include the following:   · Fever and chills    · Headaches, body aches, and muscle or joint pain    · Cough, runny nose, and sore throat    · Loss of appetite, nausea, vomiting, or diarrhea    · Tiredness    · Trouble breathing  Call 911 for any of the following:   · You have trouble breathing, and your lips look purple or blue  · You have a seizure  Seek care immediately if:   · You are dizzy, or you are urinating less or not at all  · You have a headache with a stiff neck, and you feel tired or confused  · You have new pain or pressure in your chest     · Your symptoms, such as shortness of breath, vomiting, or diarrhea, get worse  · Your symptoms, such as fever and coughing, seem to get better, but then get worse  Contact your healthcare provider if:   · You have new muscle pain or weakness  · You have questions or concerns about your condition or care  Treatment for influenza  may include any of the following:  · Acetaminophen decreases pain and fever  It is available without a doctor's order  Ask how much to take and how often to take it  Follow directions  Acetaminophen can cause liver damage if not taken correctly  · NSAIDs  such as ibuprofen, help decrease swelling, pain, and fever  This medicine is available with or without a doctor's order  NSAIDs can cause stomach bleeding or kidney problems in certain people  If you take blood thinner medicine, always ask your healthcare provider if NSAIDs are safe for you  Always read the medicine label and follow directions  · Antivirals  help fight a viral infection  Manage your symptoms:   · Rest  as much as you can to help you recover      · Drink liquids as directed  to help prevent dehydration  Ask how much liquid to drink each day and which liquids are best for you  Prevent the spread of the flu:   · Wash your hands often  Use soap and water  Wash your hands after you use the bathroom, change a child's diapers, or sneeze  Wash your hands before you prepare or eat food  Use gel hand cleanser when soap and water are not available  Do not touch your eyes, nose, or mouth unless you have washed your hands first        · Cover your mouth when you sneeze or cough  Cough into a tissue or the bend of your arm  · Clean shared items with a germ-killing   Clean table surfaces, doorknobs, and light switches  Do not share towels, silverware, and dishes with people who are sick  Wash bed sheets, towels, silverware, and dishes with soap and water  · Wear a mask  over your mouth and nose if you are sick or are near anyone who is sick  · Stay away from others  if you are sick  · Influenza vaccine  helps prevent influenza (flu)  Everyone older than 6 months should get a yearly influenza vaccine  Get the vaccine as soon as it is available, usually in September or October each year  Follow up with your healthcare provider as directed:  Write down your questions so you remember to ask them during your visits  © 2017 [employer]  Information is for End User's use only and may not be sold, redistributed or otherwise used for commercial purposes  All illustrations and images included in CareNotes® are the copyrighted property of [employer]  or [employer]  The above information is an  only  It is not intended as medical advice for individual conditions or treatments  Talk to your doctor, nurse or pharmacist before following any medical regimen to see if it is safe and effective for you       COVID-19    [address]     Your healthcare provider and/or public health staff have evaluated you and have determined that you do not need to be hospitalized at this time  At this time you can be isolated at home where you will be monitored by staff from your local or state health department  You should carefully follow the prevention and isolation steps below until a healthcare provider or local or state health department says that you can return to your normal activities  Stay home except to get medical care     People who are mildly ill with COVID-19 are able to isolate at home during their illness  You should restrict activities outside your home, except for getting medical care  Do not go to work, school, or public areas  Avoid using public transportation, ride-sharing, or taxis  Separate yourself from other people and animals in your home     People: As much as possible, you should stay in a specific room and away from other people in your home  Also, you should use a separate bathroom, if available  Animals: You should restrict contact with pets and other animals while you are sick with COVID-19, just like you would around other people  Although there have not been reports of pets or other animals becoming sick with COVID-19, it is still recommended that people sick with COVID-19 limit contact with animals until more information is known about the virus  When possible, have another member of your household care for your animals while you are sick  If you are sick with COVID-19, avoid contact with your pet, including petting, snuggling, being kissed or licked, and sharing food  If you must care for your pet or be around animals while you are sick, wash your hands before and after you interact with pets and wear a facemask  See COVID-19 and Animals for more information  Call ahead before visiting your doctor     If you have a medical appointment, call the healthcare provider and tell them that you have or may have COVID-19   This will help the healthcare provider's office take steps to keep other people from getting infected or exposed  Wear a facemask     You should wear a facemask when you are around other people (e g , sharing a room or vehicle) or pets and before you enter a healthcare provider's office  If you are not able to wear a facemask (for example, because it causes trouble breathing), then people who live with you should not stay in the same room with you, or they should wear a facemask if they enter your room  Cover your coughs and sneezes     Cover your mouth and nose with a tissue when you cough or sneeze  Throw used tissues in a lined trash can  Immediately wash your hands with soap and water for at least 20 seconds or, if soap and water are not available, clean your hands with an alcohol-based hand  that contains at least 60% alcohol  Clean your hands often     Wash your hands often with soap and water for at least 20 seconds, especially after blowing your nose, coughing, or sneezing; going to the bathroom; and before eating or preparing food  If soap and water are not readily available, use an alcohol-based hand  with at least 60% alcohol, covering all surfaces of your hands and rubbing them together until they feel dry  Soap and water are the best option if hands are visibly dirty  Avoid touching your eyes, nose, and mouth with unwashed hands  Avoid sharing personal household items     You should not share dishes, drinking glasses, cups, eating utensils, towels, or bedding with other people or pets in your home  After using these items, they should be washed thoroughly with soap and water  Clean all high-touch surfaces everyday     High touch surfaces include counters, tabletops, doorknobs, bathroom fixtures, toilets, phones, keyboards, tablets, and bedside tables  Also, clean any surfaces that may have blood, stool, or body fluids on them  Use a household cleaning spray or wipe, according to the label instructions   Labels contain instructions for safe and effective use of the cleaning product including precautions you should take when applying the product, such as wearing gloves and making sure you have good ventilation during use of the product  Monitor your symptoms     Seek prompt medical attention if your illness is worsening (e g , difficulty breathing)  Before seeking care, call your healthcare provider and tell them that you have, or are being evaluated for, COVID-19  Put on a facemask before you enter the facility  These steps will help the healthcare provider's office to keep other people in the office or waiting room from getting infected or exposed  Ask your healthcare provider to call the local or [facility] health department  Persons who are placed under active monitoring or facilitated self-monitoring should follow instructions provided by their local health department or occupational health professionals, as appropriate  If you have a medical emergency and need to call 911, notify the dispatch personnel that you have, or are being evaluated for COVID-19  If possible, put on a facemask before emergency medical services arrive  Discontinuing home isolation     Patients with confirmed COVID-19 should remain under home isolation precautions until the risk of secondary transmission to others is thought to be low  The decision to discontinue home isolation precautions should be made on a case-by-case basis, in consultation with healthcare providers and [facility] and [facility] health departments  Source: [employer]        Proceed to ER if symptoms worsen      Follow up with PCP in 3-5 days  Proceed to  ER if symptoms worsen  Chief Complaint     Chief Complaint   Patient presents with    Covid 19     Reports generalized aches, nasal congestion, headaches  Onset 2 days ago, exposed at work  Home test + for Covid   SeekingPCR test         History of Present Illness       Patient complains of cough, congestion, generalized aches, headaches for the past 2 days after COVID exposure  Home COVID test positive  Review of Systems   Review of Systems   Constitutional: Negative for chills and fever  HENT: Positive for congestion, rhinorrhea and sinus pressure  Negative for ear discharge and hearing loss  Respiratory: Positive for cough  Negative for chest tightness, shortness of breath and wheezing  Gastrointestinal: Negative for diarrhea and vomiting  Musculoskeletal: Positive for myalgias  Negative for neck stiffness  Skin: Negative for pallor  Neurological: Positive for headaches  Current Medications       Current Outpatient Medications:     methadone (DOLOPHINE) 10 mg tablet, Take by mouth every 6 (six) hours as needed for moderate pain ,  (Patient not taking: Reported on 5/16/2022,), Disp: , Rfl:     Current Allergies     Allergies as of 05/16/2022    (No Known Allergies)            The following portions of the patient's history were reviewed and updated as appropriate: allergies, current medications, past family history, past medical history, past social history, past surgical history and problem list      Past Medical History:   Diagnosis Date    No known problems     Substance abuse ([facility] [address] )        Past Surgical History:   Procedure Laterality Date    NO PAST SURGERIES      OTHER SURGICAL HISTORY      right armsurgery deeplaceration       Family History   Problem Relation Age of Onset    Diabetes Mother     Heart attack Father          Medications have been verified  Objective   Pulse 86   Temp (!) 97 °F (36 1 °C)   Resp 18   Ht 5' 7" (1 702 m)   Wt 127 kg (280 lb)   SpO2 96%   BMI 43 85 kg/m²        Physical Exam     Physical Exam  Vitals and nursing note reviewed  Constitutional:       General: He is not in acute distress  Appearance: He is well-developed  He is not diaphoretic  HENT:      Head: Normocephalic and atraumatic        Nose: Mucosal edema and congestion present  No rhinorrhea  Mouth/Throat:      Pharynx: Posterior oropharyngeal erythema present  Eyes:      Conjunctiva/sclera: Conjunctivae normal       Pupils: Pupils are equal, round, and reactive to light  Cardiovascular:      Rate and Rhythm: Normal rate and regular rhythm  Pulmonary:      Effort: Pulmonary effort is normal  No respiratory distress  Breath sounds: Normal breath sounds  Musculoskeletal:      Cervical back: Neck supple  Lymphadenopathy:      Cervical: No cervical adenopathy  Skin:     General: Skin is warm and dry  Coloration: Skin is not pale  Neurological:      Mental Status: He is alert and oriented to person, place, and time  Psychiatric:         Mood and Affect: Mood normal          Behavior: Behavior normal          Thought Content:  Thought content normal          Judgment: Judgment normal

## 2022-05-16 NOTE — LETTER
May 16, 2022     Patient: Gold Gutierrez   YOB: 1975   Date of Visit: 5/16/2022       To Whom it May Concern:    Gold Gutierrez was seen in my clinic on 5/16/2022  He should remain out of work/school for 5 days since symptom onset or 24 hours fever free without the use of fever reducing drugs, whichever is longer AND overall general improvement in symptoms and must adhere to strict masking guidelines for 5 more days         If you have any questions or concerns, please don't hesitate to call           Sincerely,          Alex Fraire MD        CC: No Recipients

## 2022-05-17 LAB — SARS-COV-2 RNA RESP QL NAA+PROBE: POSITIVE

## 2024-06-01 ENCOUNTER — HOSPITAL ENCOUNTER (INPATIENT)
Facility: HOSPITAL | Age: 49
LOS: 2 days | Discharge: HOME | DRG: 065 | End: 2024-06-03
Attending: EMERGENCY MEDICINE | Admitting: FAMILY MEDICINE
Payer: COMMERCIAL

## 2024-06-01 ENCOUNTER — APPOINTMENT (EMERGENCY)
Dept: CT IMAGING | Facility: HOSPITAL | Age: 49
DRG: 065 | End: 2024-06-01
Payer: COMMERCIAL

## 2024-06-01 DIAGNOSIS — R29.810 FACIAL DROOP: ICD-10-CM

## 2024-06-01 DIAGNOSIS — F17.200 VAPING NICOTINE DEPENDENCE, NON-TOBACCO PRODUCT: ICD-10-CM

## 2024-06-01 DIAGNOSIS — R53.1 RIGHT SIDED WEAKNESS: Primary | ICD-10-CM

## 2024-06-01 DIAGNOSIS — I63.9 CVA (CEREBRAL VASCULAR ACCIDENT) (HCC): ICD-10-CM

## 2024-06-01 PROBLEM — F41.9 ANXIETY: Status: ACTIVE | Noted: 2024-06-01

## 2024-06-01 PROBLEM — R29.90 STROKE-LIKE SYMPTOMS: Status: ACTIVE | Noted: 2024-06-01

## 2024-06-01 PROBLEM — F12.90 MARIJUANA USE: Status: ACTIVE | Noted: 2024-06-01

## 2024-06-01 PROBLEM — E66.813 OBESITY, CLASS III, BMI 40-49.9 (MORBID OBESITY): Status: ACTIVE | Noted: 2024-06-01

## 2024-06-01 PROBLEM — E66.01 OBESITY, CLASS III, BMI 40-49.9 (MORBID OBESITY) (HCC): Status: ACTIVE | Noted: 2024-06-01

## 2024-06-01 LAB
ANION GAP SERPL CALCULATED.3IONS-SCNC: 7 MMOL/L (ref 4–13)
APTT PPP: 29 SECONDS (ref 23–37)
BUN SERPL-MCNC: 13 MG/DL (ref 5–25)
CALCIUM SERPL-MCNC: 8.4 MG/DL (ref 8.4–10.2)
CARDIAC TROPONIN I PNL SERPL HS: <2 NG/L
CHLORIDE SERPL-SCNC: 107 MMOL/L (ref 96–108)
CO2 SERPL-SCNC: 23 MMOL/L (ref 21–32)
CREAT SERPL-MCNC: 0.99 MG/DL (ref 0.6–1.3)
ERYTHROCYTE [DISTWIDTH] IN BLOOD BY AUTOMATED COUNT: 12.7 % (ref 11.6–15.1)
FLUAV RNA RESP QL NAA+PROBE: NEGATIVE
FLUBV RNA RESP QL NAA+PROBE: NEGATIVE
GFR SERPL CREATININE-BSD FRML MDRD: 89 ML/MIN/1.73SQ M
GLUCOSE SERPL-MCNC: 132 MG/DL (ref 65–140)
GLUCOSE SERPL-MCNC: 99 MG/DL (ref 65–140)
HCT VFR BLD AUTO: 39.4 % (ref 36.5–49.3)
HGB BLD-MCNC: 13.4 G/DL (ref 12–17)
INR PPP: 0.94 (ref 0.84–1.19)
MCH RBC QN AUTO: 29.8 PG (ref 26.8–34.3)
MCHC RBC AUTO-ENTMCNC: 34 G/DL (ref 31.4–37.4)
MCV RBC AUTO: 88 FL (ref 82–98)
PLATELET # BLD AUTO: 177 THOUSANDS/UL (ref 149–390)
PMV BLD AUTO: 10.4 FL (ref 8.9–12.7)
POTASSIUM SERPL-SCNC: 3.9 MMOL/L (ref 3.5–5.3)
PROTHROMBIN TIME: 12.9 SECONDS (ref 11.6–14.5)
RBC # BLD AUTO: 4.49 MILLION/UL (ref 3.88–5.62)
RSV RNA RESP QL NAA+PROBE: NEGATIVE
SARS-COV-2 RNA RESP QL NAA+PROBE: NEGATIVE
SODIUM SERPL-SCNC: 137 MMOL/L (ref 135–147)
WBC # BLD AUTO: 5.52 THOUSAND/UL (ref 4.31–10.16)

## 2024-06-01 PROCEDURE — 93005 ELECTROCARDIOGRAM TRACING: CPT

## 2024-06-01 PROCEDURE — 70496 CT ANGIOGRAPHY HEAD: CPT

## 2024-06-01 PROCEDURE — 85027 COMPLETE CBC AUTOMATED: CPT | Performed by: EMERGENCY MEDICINE

## 2024-06-01 PROCEDURE — 99285 EMERGENCY DEPT VISIT HI MDM: CPT

## 2024-06-01 PROCEDURE — 99285 EMERGENCY DEPT VISIT HI MDM: CPT | Performed by: EMERGENCY MEDICINE

## 2024-06-01 PROCEDURE — 99223 1ST HOSP IP/OBS HIGH 75: CPT | Performed by: FAMILY MEDICINE

## 2024-06-01 PROCEDURE — 70498 CT ANGIOGRAPHY NECK: CPT

## 2024-06-01 PROCEDURE — 80048 BASIC METABOLIC PNL TOTAL CA: CPT | Performed by: EMERGENCY MEDICINE

## 2024-06-01 PROCEDURE — 36415 COLL VENOUS BLD VENIPUNCTURE: CPT | Performed by: EMERGENCY MEDICINE

## 2024-06-01 PROCEDURE — 84484 ASSAY OF TROPONIN QUANT: CPT | Performed by: EMERGENCY MEDICINE

## 2024-06-01 PROCEDURE — 0241U HB NFCT DS VIR RESP RNA 4 TRGT: CPT | Performed by: EMERGENCY MEDICINE

## 2024-06-01 PROCEDURE — 85730 THROMBOPLASTIN TIME PARTIAL: CPT | Performed by: EMERGENCY MEDICINE

## 2024-06-01 PROCEDURE — 85610 PROTHROMBIN TIME: CPT | Performed by: EMERGENCY MEDICINE

## 2024-06-01 PROCEDURE — 82948 REAGENT STRIP/BLOOD GLUCOSE: CPT

## 2024-06-01 RX ORDER — ASPIRIN 81 MG/1
324 TABLET, CHEWABLE ORAL ONCE
Status: COMPLETED | OUTPATIENT
Start: 2024-06-01 | End: 2024-06-01

## 2024-06-01 RX ORDER — ENOXAPARIN SODIUM 100 MG/ML
40 INJECTION SUBCUTANEOUS DAILY
Status: DISCONTINUED | OUTPATIENT
Start: 2024-06-02 | End: 2024-06-03 | Stop reason: HOSPADM

## 2024-06-01 RX ORDER — ASPIRIN 81 MG/1
81 TABLET, CHEWABLE ORAL DAILY
Status: DISCONTINUED | OUTPATIENT
Start: 2024-06-02 | End: 2024-06-03 | Stop reason: HOSPADM

## 2024-06-01 RX ORDER — CLOPIDOGREL BISULFATE 75 MG/1
300 TABLET ORAL ONCE
Status: COMPLETED | OUTPATIENT
Start: 2024-06-01 | End: 2024-06-01

## 2024-06-01 RX ORDER — ACETAMINOPHEN 325 MG/1
650 TABLET ORAL EVERY 6 HOURS PRN
Status: DISCONTINUED | OUTPATIENT
Start: 2024-06-01 | End: 2024-06-03 | Stop reason: HOSPADM

## 2024-06-01 RX ORDER — ATORVASTATIN CALCIUM 40 MG/1
40 TABLET, FILM COATED ORAL EVERY EVENING
Status: DISCONTINUED | OUTPATIENT
Start: 2024-06-01 | End: 2024-06-03 | Stop reason: HOSPADM

## 2024-06-01 RX ADMIN — IOHEXOL 100 ML: 350 INJECTION, SOLUTION INTRAVENOUS at 19:26

## 2024-06-01 RX ADMIN — ATORVASTATIN CALCIUM 40 MG: 40 TABLET, FILM COATED ORAL at 22:02

## 2024-06-01 RX ADMIN — ASPIRIN 81 MG 324 MG: 81 TABLET ORAL at 19:55

## 2024-06-01 RX ADMIN — CLOPIDOGREL 300 MG: 75 TABLET ORAL at 19:56

## 2024-06-01 NOTE — LETTER
[facility]  [address]  [place] [id number]  Dept: [phone]    Susanne 3, 2024     Patient: Gregor Marrero   YOB: 1975   Date of Visit: 6/1/2024       To Whom it May Concern:    Gregor Marrero is under my professional care. He was seen in the hospital from 6/1/2024 to 06/03/24. He may return to work on 6/10/2024 if cleared by company physician with the following limitations light duty if possible or further limitations as recommended by company physician/physical therapy if required .    If you have any questions or concerns, please don't hesitate to call.         Sincerely,          Makayla Bermeo PA-C

## 2024-06-01 NOTE — LETTER
[facility]  [address]  [place] [address]  Dept: [phone]    Susanne 3, 2024     Patient: Gregor Marrero   YOB: 1975   Date of Visit: 6/1/2024       To Whom it May Concern:    Gregor Marrero is under my professional care. He was seen in the hospital from 6/1/2024 to 06/03/24. He may return to work on 6/10/2024 if cleared by company physician.    If you have any questions or concerns, please don't hesitate to call.         Sincerely,          Makayla Bermeo PA-C

## 2024-06-01 NOTE — ED PROVIDER NOTES
History  Chief Complaint   Patient presents with    STROKE Alert     Pre hosptial stroke alert called at 1903. Last known well was 5am. Woke up at 1pm not feeling right. 5pm noticed right facial droop, right arm/leg heaviness.      Patient is a 48-year-old male brought to the emergency department by EMS as a prehospital stroke alert for right arm and leg weakness with a right-sided facial droop, patient reports he woke up around 5:00 in the morning and felt fine, he went back to sleep, woke up again around 1:00 in the afternoon, states he did not feel quite right, could not pinpoint any specific symptoms but he took 2 Tylenol, and then he got ready to go to work, while at work he had experienced some weakness in his right upper and lower extremities and coworkers noted a right-sided facial droop prompting them to call EMS to bring patient to ED for evaluation, patient denies any injuries, no pain, no history of similar symptoms previously, admits that he has not seen a doctor in at least 5 years, has a previous history of substance abuse and was on methadone but no longer taking methadone, has been clean and denies any further substance abuse, he does report a pins and needle sensation in his right upper extremity, the sensation that his face is not moving correctly when he talks and reports he feels as though his right hand is weak, he had trouble using a pen to write his name while at work, on arrival patient is moving all extremities, has good  strength bilaterally, a slight right-sided facial droop is noted        Prior to Admission Medications   Prescriptions Last Dose Informant Patient Reported? Taking?   methadone (DOLOPHINE) 10 mg tablet Not Taking Self Yes No   Sig: Take by mouth every 6 (six) hours as needed for moderate pain ,    Patient not taking: Reported on 5/16/2022,      Facility-Administered Medications: None       Past Medical History:   Diagnosis Date    No known problems     Substance abuse  (HCC)        Past Surgical History:   Procedure Laterality Date    NO PAST SURGERIES      OTHER SURGICAL HISTORY      right armsurgery deeplaceration       Family History   Problem Relation Age of Onset    Diabetes Mother     Heart attack Father      I have reviewed and agree with the history as documented.    E-Cigarette/Vaping    E-Cigarette Use Current Every Day User      E-Cigarette/Vaping Substances    Nicotine Yes      Social History     Tobacco Use    Smoking status: Former     Current packs/day: 0.50     Types: Cigarettes    Smokeless tobacco: Never   Vaping Use    Vaping status: Every Day    Substances: Nicotine   Substance Use Topics    Alcohol use: Not Currently    Drug use: Yes     Types: Marijuana     Comment: medica lmarijuana       Review of Systems   Constitutional: Negative.    HENT: Negative.     Eyes: Negative.    Respiratory: Negative.     Cardiovascular: Negative.    Gastrointestinal: Negative.    Endocrine: Negative.    Genitourinary: Negative.    Musculoskeletal: Negative.    Skin: Negative.    Allergic/Immunologic: Negative.    Neurological:  Positive for facial asymmetry, weakness and numbness.   Hematological: Negative.    Psychiatric/Behavioral: Negative.         Physical Exam  Physical Exam  Constitutional:       Appearance: Normal appearance. He is well-developed. He is obese.   HENT:      Head: Normocephalic and atraumatic.      Nose: Nose normal.      Mouth/Throat:      Mouth: Mucous membranes are moist.   Eyes:      Extraocular Movements: Extraocular movements intact.      Conjunctiva/sclera: Conjunctivae normal.      Pupils: Pupils are equal, round, and reactive to light.   Cardiovascular:      Rate and Rhythm: Regular rhythm. Tachycardia present.      Heart sounds: Normal heart sounds.   Pulmonary:      Effort: Pulmonary effort is normal.      Breath sounds: Normal breath sounds.   Abdominal:      General: Abdomen is flat.      Palpations: Abdomen is soft.   Musculoskeletal:          General: Normal range of motion.      Cervical back: Normal range of motion and neck supple.   Skin:     General: Skin is warm and dry.   Neurological:      Mental Status: He is alert and oriented to person, place, and time.      GCS: GCS eye subscore is 4. GCS verbal subscore is 5. GCS motor subscore is 6.      Sensory: Sensation is intact.      Motor: Motor function is intact.      Coordination: Coordination is intact.      Comments: Slight facial droop noted on right upper lip         Vital Signs  ED Triage Vitals   Temperature Pulse Respirations Blood Pressure SpO2   06/01/24 1915 06/01/24 1915 06/01/24 1915 06/01/24 1915 06/01/24 1915   98.7 °F (37.1 °C) (!) 111 18 (!) 173/76 97 %      Temp Source Heart Rate Source Patient Position - Orthostatic VS BP Location FiO2 (%)   06/01/24 1915 06/01/24 1915 06/01/24 1915 06/01/24 2030 --   Temporal Monitor Lying Left arm       Pain Score       06/01/24 2036       No Pain           Vitals:    06/01/24 2000 06/01/24 2030 06/01/24 2035 06/01/24 2130   BP: 164/70 117/81 117/81 123/80   Pulse: 91 74 71 69   Patient Position - Orthostatic VS: Lying Lying  Lying         Visual Acuity  Visual Acuity      Flowsheet Row Most Recent Value   L Pupil Size (mm) 2   R Pupil Size (mm) 2            ED Medications  Medications   atorvastatin (LIPITOR) tablet 40 mg (40 mg Oral Given 6/1/24 2202)   aspirin chewable tablet 81 mg (has no administration in time range)   enoxaparin (LOVENOX) subcutaneous injection 40 mg (has no administration in time range)   acetaminophen (TYLENOL) tablet 650 mg (has no administration in time range)   iohexol (OMNIPAQUE) 350 MG/ML injection (SINGLE-DOSE) 100 mL (100 mL Intravenous Given 6/1/24 1926)   aspirin chewable tablet 324 mg (324 mg Oral Given 6/1/24 1955)   clopidogrel (PLAVIX) tablet 300 mg (300 mg Oral Given 6/1/24 1956)       Diagnostic Studies  Results Reviewed       Procedure Component Value Units Date/Time    FLU/RSV/COVID - if FLU/RSV  clinically relevant [[id number]]  (Normal) Collected: 06/01/24 1931    Lab Status: Final result Specimen: Nares from Nose Updated: 06/01/24 2021     SARS-CoV-2 Negative     INFLUENZA A PCR Negative     INFLUENZA B PCR Negative     RSV PCR Negative    Narrative:      FOR PEDIATRIC PATIENTS - copy/paste COVID Guidelines URL to browser: [email or web address]    SARS-CoV-2 assay is a Nucleic Acid Amplification assay intended for the  qualitative detection of nucleic acid from SARS-CoV-2 in nasopharyngeal  swabs. Results are for the presumptive identification of SARS-CoV-2 RNA.    Positive results are indicative of infection with SARS-CoV-2, the virus  causing COVID-19, but do not rule out bacterial infection or co-infection  with other viruses. Laboratories within the [place] and its  territories are required to report all positive results to the appropriate  public health authorities. Negative results do not preclude SARS-CoV-2  infection and should not be used as the sole basis for treatment or other  patient management decisions. Negative results must be combined with  clinical observations, patient history, and epidemiological information.  This test has not been FDA cleared or approved.    This test has been authorized by FDA under an Emergency Use Authorization  (EUA). This test is only authorized for the duration of time the  declaration that circumstances exist justifying the authorization of the  emergency use of an in vitro diagnostic tests for detection of SARS-CoV-2  virus and/or diagnosis of COVID-19 infection under section 564(b)(1) of  the Act, 21 U.S.C. 360bbb-3(b)(1), unless the authorization is terminated  or revoked sooner. The test has been validated but independent review by FDA  and CLIA is pending.    Test performed using [employer] GeneXpert: This RT-PCR assay targets N2,  a region unique to SARS-CoV-2. A conserved region in the E-gene was  chosen  for pan-Sarbecovirus detection which includes SARS-CoV-2.    According to CMS-2020-01-R, this platform meets the definition of high-throughput technology.    HS Troponin 0hr (reflex protocol) [[id number]]  (Normal) Collected: 06/01/24 1931    Lab Status: Final result Specimen: Blood from Arm, Right Updated: 06/01/24 2001     hs TnI 0hr <2 ng/L     Basic metabolic panel [[id number]] Collected: 06/01/24 1931    Lab Status: Final result Specimen: Blood from Arm, Right Updated: 06/01/24 1957     Sodium 137 mmol/L      Potassium 3.9 mmol/L      Chloride 107 mmol/L      CO2 23 mmol/L      ANION GAP 7 mmol/L      BUN 13 mg/dL      Creatinine 0.99 mg/dL      Glucose 99 mg/dL      Calcium 8.4 mg/dL      eGFR 89 ml/min/1.73sq m     Narrative:      National Kidney Disease Foundation guidelines for Chronic Kidney Disease (CKD):     Stage 1 with normal or high GFR (GFR > 90 mL/min/1.73 square meters)    Stage 2 Mild CKD (GFR = 60-89 mL/min/1.73 square meters)    Stage 3A Moderate CKD (GFR = 45-59 mL/min/1.73 square meters)    Stage 3B Moderate CKD (GFR = 30-44 mL/min/1.73 square meters)    Stage 4 Severe CKD (GFR = 15-29 mL/min/1.73 square meters)    Stage 5 End Stage CKD (GFR <15 mL/min/1.73 square meters)  Note: GFR calculation is accurate only with a steady state creatinine    Protime-INR [[id number]]  (Normal) Collected: 06/01/24 1931    Lab Status: Final result Specimen: Blood from Arm, Right Updated: 06/01/24 1950     Protime 12.9 seconds      INR 0.94    APTT [[id number]]  (Normal) Collected: 06/01/24 1931    Lab Status: Final result Specimen: Blood from Arm, Right Updated: 06/01/24 1950     PTT 29 seconds     CBC and Platelet [[id number]]  (Normal) Collected: 06/01/24 1931    Lab Status: Final result Specimen: Blood from Arm, Right Updated: 06/01/24 1936     WBC 5.52 Thousand/uL      RBC 4.49 Million/uL      Hemoglobin 13.4 g/dL      Hematocrit 39.4 %      MCV 88 fL      MCH 29.8 pg      MCHC 34.0 g/dL      RDW 12.7 %       Platelets 177 Thousands/uL      MPV 10.4 fL     Fingerstick Glucose (POCT) [[id number]]  (Normal) Collected: 06/01/24 1916    Lab Status: Final result Specimen: Blood Updated: 06/01/24 1917     POC Glucose 132 mg/dl                    CTA stroke alert (head/neck)   Final Result by Bull Celeste MD (06/01 1941)      No evidence for high-grade stenosis or major branch vessel occlusion of the cervical or intracranial vasculature.            Findings were directly discussed with Nicolas Meza at 7:40 p.m.                           Workstation performed: [id number]         CT stroke alert brain   Final Result by Bull Celeste MD (06/01 1942)      No CT evidence for large acute vascular distribution infarct or acute intracranial hemorrhage.      Findings were directly discussed with Nicolas Meza at approximately 7:40 p.m.      Workstation performed: [id number]         MRI Inpatient Order    (Results Pending)              Procedures  ECG 12 Lead Documentation Only    Date/Time: 6/1/2024 7:55 PM    Performed by: Naomi Ramirez DO  Authorized by: Naomi Ramirez DO    Indications / Diagnosis:  Strokelike symptoms  ECG reviewed by me, the ED Provider: yes    Patient location:  ED  Previous ECG:     Comparison to cardiac monitor: Yes    Interpretation:     Interpretation: abnormal    Rate:     ECG rate:  105    ECG rate assessment: tachycardic    Rhythm:     Rhythm: sinus tachycardia    Ectopy:     Ectopy: none    QRS:     QRS intervals:  Normal  Conduction:     Conduction: normal    ST segments:     ST segments:  Normal  T waves:     T waves: normal             ED Course  ED Course as of 06/01/24 2231   Sat Jun 01, 2024   1922 Prehospital stroke alert was called based on EMS report, patient taken right to CT scan, evaluated briefly in CT   1957 [place] text from Dr. Rodney, recommending admit for stroke pathway, loaded with aspirin and Plavix                  Stroke Assessment       Row Name 06/01/24 1918             [facility]  Stroke Scale    Interval Baseline      Level of Consciousness (1a.) 0      LOC Questions (1b.) 0      LOC Commands (1c.) 0      Best Gaze (2.) 0      Visual (3.) 0      Facial Palsy (4.) 1      Motor Arm, Left (5a.) 0      Motor Arm, Right (5b.) 0      Motor Leg, Left (6a.) 0      Motor Leg, Right (6b.) 0      Limb Ataxia (7.) 0      Sensory (8.) 0      Best Language (9.) 0      Dysarthria (10.) 0      Extinction and Inattention (11.) (Formerly Neglect) 0      Total 1                                SBIRT 22yo+      Flowsheet Row Most Recent Value   Initial Alcohol Screen: US AUDIT-C     1. How often do you have a drink containing alcohol? 0 Filed at: 06/01/2024 1941   2. How many drinks containing alcohol do you have on a typical day you are drinking?  0 Filed at: 06/01/2024 1941   3a. Male UNDER 65: How often do you have five or more drinks on one occasion? 0 Filed at: 06/01/2024 1941   Audit-C Score 0 Filed at: 06/01/2024 1941   TOBIAS: How many times in the past year have you...    Used an illegal drug or used a prescription medication for non-medical reasons? Never Filed at: 06/01/2024 1941                      Medical Decision Making  Patient presents for symptoms concerning for acute CVA versus TIA.  Other items on the differential include dissection, AMI, hypoglycemia and other metabolic derangement such as hepatic/uremic encephalopathy, medication side effect, or postictal Don's paralysis.  However presentation most concerning for CVA.  EKG without evidence of STEMI or acute ischemia, fingerstick not hypoglycemic, and clinical picture does not suggest other stroke mimic.  Neurology consulted and recommendations noted in chart course.  Plan to admit to medicine for further evaluation and treatment.  Patient not a candidate for TNK.    Problems Addressed:  Right sided weakness: acute illness or injury    Amount and/or Complexity of Data Reviewed  Labs: ordered. Decision-making details documented in ED  Course.  Radiology: ordered. Decision-making details documented in ED Course.  ECG/medicine tests: ordered and independent interpretation performed. Decision-making details documented in ED Course.    Risk  OTC drugs.  Prescription drug management.  Decision regarding hospitalization.             Disposition  Final diagnoses:   Right sided weakness   Facial droop   CVA (cerebral vascular accident) ([facility])     Time reflects when diagnosis was documented in both MDM as applicable and the Disposition within this note       Time User Action Codes Description Comment    6/1/2024  7:18 PM Naomi Ramirez [R53.1] Right sided weakness     6/1/2024  8:12 PM Naomi Ramirez [R29.810] Facial droop     6/1/2024  8:12 PM Naomi Ramirez [I63.9] CVA (cerebral vascular accident) ([facility])           ED Disposition       ED Disposition   Admit    Condition   Stable    Date/Time   Sat Jun 1, 2024 2012    Comment   Case was discussed with Dr Mckeon and the patient's admission status was agreed to be Admission Status: inpatient status to the service of Dr. Mckeon .               Follow-up Information    None         Current Discharge Medication List        CONTINUE these medications which have NOT CHANGED    Details   methadone (DOLOPHINE) 10 mg tablet Take by mouth every 6 (six) hours as needed for moderate pain ,              No discharge procedures on file.    PDMP Review       None            ED Provider  Electronically Signed by             Naomi Ramirez DO  06/01/24 1766

## 2024-06-02 LAB
ANION GAP SERPL CALCULATED.3IONS-SCNC: 4 MMOL/L (ref 4–13)
ATRIAL RATE: 105 BPM
BUN SERPL-MCNC: 10 MG/DL (ref 5–25)
CALCIUM SERPL-MCNC: 8.6 MG/DL (ref 8.4–10.2)
CHLORIDE SERPL-SCNC: 110 MMOL/L (ref 96–108)
CHOLEST SERPL-MCNC: 161 MG/DL
CO2 SERPL-SCNC: 23 MMOL/L (ref 21–32)
CREAT SERPL-MCNC: 0.87 MG/DL (ref 0.6–1.3)
ERYTHROCYTE [DISTWIDTH] IN BLOOD BY AUTOMATED COUNT: 12.7 % (ref 11.6–15.1)
EST. AVERAGE GLUCOSE BLD GHB EST-MCNC: 105 MG/DL
GFR SERPL CREATININE-BSD FRML MDRD: 102 ML/MIN/1.73SQ M
GLUCOSE SERPL-MCNC: 90 MG/DL (ref 65–140)
HBA1C MFR BLD: 5.3 %
HCT VFR BLD AUTO: 40.7 % (ref 36.5–49.3)
HDLC SERPL-MCNC: 39 MG/DL
HGB BLD-MCNC: 13.6 G/DL (ref 12–17)
LDLC SERPL CALC-MCNC: 95 MG/DL (ref 0–100)
MCH RBC QN AUTO: 29.8 PG (ref 26.8–34.3)
MCHC RBC AUTO-ENTMCNC: 33.4 G/DL (ref 31.4–37.4)
MCV RBC AUTO: 89 FL (ref 82–98)
P AXIS: 56 DEGREES
PLATELET # BLD AUTO: 162 THOUSANDS/UL (ref 149–390)
PMV BLD AUTO: 10.6 FL (ref 8.9–12.7)
POTASSIUM SERPL-SCNC: 4 MMOL/L (ref 3.5–5.3)
PR INTERVAL: 160 MS
QRS AXIS: 67 DEGREES
QRSD INTERVAL: 114 MS
QT INTERVAL: 346 MS
QTC INTERVAL: 457 MS
RBC # BLD AUTO: 4.57 MILLION/UL (ref 3.88–5.62)
SODIUM SERPL-SCNC: 137 MMOL/L (ref 135–147)
T WAVE AXIS: 54 DEGREES
TRIGL SERPL-MCNC: 136 MG/DL
TSH SERPL DL<=0.05 MIU/L-ACNC: 1.42 UIU/ML (ref 0.45–4.5)
VENTRICULAR RATE: 105 BPM
WBC # BLD AUTO: 4.69 THOUSAND/UL (ref 4.31–10.16)

## 2024-06-02 PROCEDURE — G0426 INPT/ED TELECONSULT50: HCPCS | Performed by: STUDENT IN AN ORGANIZED HEALTH CARE EDUCATION/TRAINING PROGRAM

## 2024-06-02 PROCEDURE — 83036 HEMOGLOBIN GLYCOSYLATED A1C: CPT | Performed by: FAMILY MEDICINE

## 2024-06-02 PROCEDURE — 80048 BASIC METABOLIC PNL TOTAL CA: CPT | Performed by: FAMILY MEDICINE

## 2024-06-02 PROCEDURE — 93010 ELECTROCARDIOGRAM REPORT: CPT | Performed by: INTERNAL MEDICINE

## 2024-06-02 PROCEDURE — 99232 SBSQ HOSP IP/OBS MODERATE 35: CPT

## 2024-06-02 PROCEDURE — 85027 COMPLETE CBC AUTOMATED: CPT | Performed by: FAMILY MEDICINE

## 2024-06-02 PROCEDURE — 84443 ASSAY THYROID STIM HORMONE: CPT

## 2024-06-02 PROCEDURE — NC001 PR NO CHARGE: Performed by: STUDENT IN AN ORGANIZED HEALTH CARE EDUCATION/TRAINING PROGRAM

## 2024-06-02 PROCEDURE — 80061 LIPID PANEL: CPT | Performed by: FAMILY MEDICINE

## 2024-06-02 RX ORDER — NICOTINE 21 MG/24HR
14 PATCH, TRANSDERMAL 24 HOURS TRANSDERMAL DAILY
Status: DISCONTINUED | OUTPATIENT
Start: 2024-06-02 | End: 2024-06-03 | Stop reason: HOSPADM

## 2024-06-02 RX ADMIN — ASPIRIN 81 MG 81 MG: 81 TABLET ORAL at 08:23

## 2024-06-02 RX ADMIN — ATORVASTATIN CALCIUM 40 MG: 40 TABLET, FILM COATED ORAL at 17:32

## 2024-06-02 NOTE — PROGRESS NOTES
[facility]  Progress Note  Name: Gregor Marrero I  MRN: [id number]  Unit/Bed#: -[facility] I Date of Admission: 6/1/2024   Date of Service: 6/2/2024 I Hospital Day: 1    Assessment & Plan   * Stroke-like symptoms  Assessment & Plan  Patient with a right-sided facial droop and arm and leg weakness.  NIH 1  CT head and CTA without abnormality  Loaded with aspirin and Plavix  Continue aspirin and statin daily   Consult neurology  Tele   Permissive HTN  MRI of the brain  Echocardiogram  Lipid panel with mildly low hdl  A1c pending  PT/OT/ST    Obesity, Class III, BMI 40-49.9 (morbid obesity) ([facility])  Assessment & Plan  Continue to educate on lifestyle changes     Marijuana use  Assessment & Plan  For anxiety    Anxiety  Assessment & Plan  Does not take medications, uses marijuana to cope with anxiety    Vaping nicotine dependence, non-tobacco product  Assessment & Plan  Will place the patient on a nicotine patch.               VTE Pharmacologic Prophylaxis: VTE Score: 6 High Risk (Score >/= 5) - Pharmacological DVT Prophylaxis Ordered: enoxaparin (Lovenox). Sequential Compression Devices Ordered.    Mobility:   Basic Mobility Inpatient Raw Score: 24  JH-HLM Goal: 8: Walk 250 feet or more  JH-HLM Achieved: 8: Walk 250 feet ot more  JH-HLM Goal achieved. Continue to encourage appropriate mobility.    Patient Centered Rounds: I performed bedside rounds with nursing staff today.   Discussions with Specialists or Other Care Team Provider: nursing, cm and neurology    Education and Discussions with Family / Patient: Updated  (significant other) at bedside.    Total Time Spent on Date of Encounter in care of patient:  mins. This time was spent on one or more of the following: performing physical exam; counseling and coordination of care; obtaining or reviewing history; documenting in the medical record; reviewing/ordering tests, medications or procedures; communicating with other  healthcare professionals and discussing with patient's family/caregivers.    Current Length of Stay: 1 day(s)  Current Patient Status: Inpatient   Certification Statement: The patient will continue to require additional inpatient hospital stay due to requiring mri, echo and stroke work up  Discharge Plan: Anticipate discharge tomorrow to home.    Code Status: Level 1 - Full Code    Subjective:   Patient states that he is still having some mild right hand weakness and right sided facial droop. Denies chest pain, shortness of breath or abdominal pain.    Objective:     Vitals:   Temp (24hrs), Av.7 °F (36.5 °C), Min:97.2 °F (36.2 °C), Max:98.7 °F (37.1 °C)    Temp:  [97.2 °F (36.2 °C)-98.7 °F (37.1 °C)] 97.7 °F (36.5 °C)  HR:  [] 81  Resp:  [18-20] 18  BP: (115-181)/() 181/106  SpO2:  [93 %-98 %] 94 %  Body mass index is 42.22 kg/m².     Input and Output Summary (last 24 hours):   No intake or output data in the 24 hours ending 24 1052    Physical Exam:   Physical Exam  Vitals reviewed.   Constitutional:       General: He is not in acute distress.     Appearance: Normal appearance. He is obese. He is not ill-appearing.   HENT:      Head: Normocephalic and atraumatic.      Nose: Nose normal.      Mouth/Throat:      Mouth: Mucous membranes are moist.      Pharynx: Oropharynx is clear.   Eyes:      Extraocular Movements: Extraocular movements intact.      Conjunctiva/sclera: Conjunctivae normal.   Cardiovascular:      Rate and Rhythm: Normal rate and regular rhythm.      Pulses: Normal pulses.      Heart sounds: Normal heart sounds. No murmur heard.  Pulmonary:      Effort: Pulmonary effort is normal. No respiratory distress.      Breath sounds: Normal breath sounds. No wheezing.   Abdominal:      General: Abdomen is flat. Bowel sounds are normal. There is no distension.      Palpations: Abdomen is soft.      Tenderness: There is no abdominal tenderness. There is no guarding.   Musculoskeletal:          General: Normal range of motion.      Cervical back: Normal range of motion.      Right lower leg: No edema.      Left lower leg: No edema.   Skin:     General: Skin is warm.   Neurological:      General: No focal deficit present.      Mental Status: He is alert and oriented to person, place, and time. Mental status is at baseline.      Motor: No weakness.      Coordination: Coordination normal.      Gait: Gait normal.      Comments: Equal strength in all extremities  Follows commands  Mild right sided facial droop  No slurred speech noted  Proprioception intact    Psychiatric:         Mood and Affect: Mood normal.         Behavior: Behavior normal.         Thought Content: Thought content normal.         Judgment: Judgment normal.          Additional Data:     Labs:  Results from last 7 days   Lab Units 06/02/24  0537   WBC Thousand/uL 4.69   HEMOGLOBIN g/dL 13.6   HEMATOCRIT % 40.7   PLATELETS Thousands/uL 162     Results from last 7 days   Lab Units 06/02/24  0537   SODIUM mmol/L 137   POTASSIUM mmol/L 4.0   CHLORIDE mmol/L 110*   CO2 mmol/L 23   BUN mg/dL 10   CREATININE mg/dL 0.87   ANION GAP mmol/L 4   CALCIUM mg/dL 8.6   GLUCOSE RANDOM mg/dL 90     Results from last 7 days   Lab Units 06/01/24  1931   INR  0.94     Results from last 7 days   Lab Units 06/01/24  1916   POC GLUCOSE mg/dl 132               Lines/Drains:  Invasive Devices       Peripheral Intravenous Line  Duration             Peripheral IV 06/01/24 Proximal;Right;Ventral (anterior) Forearm <1 day                      Telemetry:  Telemetry Orders (From admission, onward)               24 Hour Telemetry Monitoring  Continuous x 24 Hours (Telem)        Question:  Reason for 24 Hour Telemetry  Answer:  TIA/Suspected CVA/ Confirmed CVA                     Telemetry Reviewed: Normal Sinus Rhythm  Indication for Continued Telemetry Use: Acute CVA             Imaging: Reviewed radiology reports from this admission including: CT head    Recent Cultures  (last 7 days):         Last 24 Hours Medication List:   Current Facility-Administered Medications   Medication Dose Route Frequency Provider Last Rate    acetaminophen  650 mg Oral Q6H PRN Apolinar Mckeon MD      aspirin  81 mg Oral Daily Apolinar Mckeon MD      atorvastatin  40 mg Oral QPM Apolinar Mckeon MD      enoxaparin  40 mg Subcutaneous Daily Apolinar Mckeon MD      nicotine  14 mg Transdermal Daily Marisela Kaiser PA-C          Today, Patient Was Seen By: Marisela Kaiser PA-C    **Please Note: This note may have been constructed using a voice recognition system.**

## 2024-06-02 NOTE — QUICK NOTE
"Stroke Alert Note   Gregor Marrero 48 y.o. male  MRN: [id number]   Unit/Bed#: -01 Encounter: [id number]     Call from  received at: 7:07 PM, however patient did not arrive in the ED until 7:12 PM, and I was contacted by the ED provider directly about the patient approximately 10 minutes after the patient arrived  Neurology response by phone and via secure text messaging was immediate    48M with no known medical history presented as a prehospital stroke alert.  He woke up at 5 AM feeling at baseline and went back to bed.  Then he woke up again at 1 PM this afternoon and did not feel quite right, although cannot say in what way.  He took Tylenol and went to work, but while there he noticed he was having difficulty holding a pen in his right hand and also weakness in his right leg with difficulty lifting it off the floor and moving it.  At some point during the day his coworkers noticed a right facial droop, which led to them calling 911.  By the time he arrived his symptoms were much better.  The ED provider's exam revealed a mild right facial droop, but was otherwise normal, although the patient was still reporting a pins and needle sensation in his right arm, but he was no longer having any difficulty moving his right arm or leg, and had full strength.  He was mildly tachycardic on arrival from sinus tachycardia, and he was hypertensive with /76.    NIHSSS 1    CT head wo: \"No CT evidence for large acute vascular distribution infarct or acute intracranial hemorrhage.\"  CTA head/neck: \"No evidence for high-grade stenosis or major branch vessel occlusion of the cervical or intracranial vasculature.\"    Thrombolytic decision: Patient not a candidate. Symptoms resolved/clearly non disabling.  Symptoms sound to be consistent with a stroke/TIA, however, fortunately they have nearly resolved.  - Admit on stroke pathway  - Recommend loading with aspirin 325mg once, then continuing 81 mg daily, and " loading with plavix 300mg once, followed by 75mg daily.  - start lipitor 40mg Qday  - MRI brain wo, echo, lipid panel, HbA1c  - permissive HTN to 220/110 for 24 hours, monitor on telemetry  - normothermia, euglycemia  - avoid hypotonic fluids.        Nicolas Meza MD

## 2024-06-02 NOTE — H&P
[facility]  H&P  Name: Gregor Marrero 48 y.o. male I MRN: [id number]  Unit/Bed#: -01 I Date of Admission: 6/1/2024   Date of Service: 6/1/2024 I Hospital Day: 0      Assessment & Plan   * Stroke-like symptoms  Assessment & Plan  Patient with a right-sided facial droop and arm and leg weakness.  CT head and CTA without abnormality    Loaded with aspirin and Plavix  Continue aspirin 81 mg daily  Consult neurology  MRI of the brain  Echocardiogram  Obtain lipid panel A1c in the morning  CBC, BMP in the morning    Anxiety  Assessment & Plan  Does not take medications, uses marijuana to cope with anxiety    Marijuana use  Assessment & Plan  For anxiety    Vaping nicotine dependence, non-tobacco product  Assessment & Plan  Will place the patient on a nicotine patch.    Obesity, Class III, BMI 40-49.9 (morbid obesity) ([facility]ly [facility])  Assessment & Plan  BMI of 42.22         Disposition  #1  Continue aspirin daily  #2  Neurology consultation  #3  MRI of the brain and echocardiogram  #4  CBC, BMP, A1c, lipid panel in the morning        VTE Prophylaxis: Enoxaparin (Lovenox)  / sequential compression device   Code Status: Level 1 - Full Code       Anticipated Length of Stay:  Patient will be admitted on an Inpatient basis with an anticipated length of stay of greater than 2 midnights.   Justification for Hospital Stay: Please see detailed plans noted above.    Chief Complaint:     Right-sided weakness  History of Present Illness:  Gregor Marrero is a 48 y.o. male with no past medical history, takes no medications at home presents via EMS as a prehospital stroke alert for right arm and leg weakness with right facial droop.  The patient woke up around 5 AM this morning and he was fine, and he went back to sleep and woke up around 1 PM in the afternoon.  He states he did not feel quite right and took some Tylenol and went to work.  At work he experienced some weakness in his right upper and lower  extremity and coworkers noticed a right-sided facial droop prompting them to call EMS to bring the patient to the ER.  The patient has not seen a provider in 5 years and does do vaping and marijuana use.  Patient was previously on methadone and no longer taking.  Has been clean and denies any further substance abuse.  He reports pins and needle sensation in the right upper extremity.  He had trouble using a pen to write his name while at work.  Upon arrival in the emergency room, patient can move all extremities but still has a right-sided facial droop is noted.      Review of Systems:    Constitutional:  Denies fever or chills   Eyes:  Denies change in visual acuity   HENT: Face feels funny  Respiratory:  Denies cough or shortness of breath   Cardiovascular:  Denies chest pain or edema   GI:  Denies abdominal pain or bloody stools  :  Denies dysuria   Musculoskeletal: Right-sided upper and lower extremity weakness  Integument:  Denies rash   Neurologic: Pins-and-needles of the right side endocrine:  Denies polyuria or polydipsia   Lymphatic:  Denies swollen glands   Psychiatric:  Denies depression or anxiety     Past Medical and Surgical History:   Past Medical History:   Diagnosis Date    No known problems     Substance abuse (HCC)      Past Surgical History:   Procedure Laterality Date    NO PAST SURGERIES      OTHER SURGICAL HISTORY      right armsurgery deeplaceration       Meds/Allergies:  Medications Prior to Admission   Medication Sig Dispense Refill Last Dose    methadone (DOLOPHINE) 10 mg tablet Take by mouth every 6 (six) hours as needed for moderate pain ,  (Patient not taking: Reported on 5/16/2022,)   Not Taking       Allergies: No Known Allergies    History:  Marital Status:      Substance Use History:   Social History     Substance and Sexual Activity   Alcohol Use Not Currently     Social History     Tobacco Use   Smoking Status Former    Current packs/day: 0.50    Types: Cigarettes  "  Smokeless Tobacco Never     Social History     Substance and Sexual Activity   Drug Use Yes    Types: Marijuana    Comment: [employer]       Family History:  Family History   Problem Relation Age of Onset    Diabetes Mother     Heart attack Father        Physical Exam:     Vitals:   Blood Pressure: 117/81 (06/01/24 2035)  Pulse: 71 (06/01/24 2035)  Temperature: 97.7 °F (36.5 °C) (06/01/24 2035)  Temp Source: Temporal (06/01/24 2030)  Respirations: 18 (06/01/24 2035)  Height: 5' 6\" (167.6 cm) (06/01/24 2030)  Weight - Scale: 119 kg (261 lb 9.6 oz) (06/01/24 2030)  SpO2: 96 % (06/01/24 2035)    Constitutional:  Non-toxic appearance  Eyes:  EOMI, No scleral icterus   HENT:   oropharynx moist, external ears normal, external nose normal   Respiratory:  No respiratory distress, no wheezing   Cardiovascular:  Normal rate, no murmurs   GI:  Soft, nondistended, no guarding   :  No costovertebral angle tenderness   Musculoskeletal:  no tenderness, no deformities.   Integument:  no jaundice, no rash   Neurologic:  Alert &awake, communicative, CN 2-12 normal, right-sided facial droop noted  Psychiatric:  Speech and behavior appropriate       Lab Results: I have personally reviewed pertinent reports.      Results from last 7 days   Lab Units 06/01/24 1931   WBC Thousand/uL 5.52   HEMOGLOBIN g/dL 13.4   HEMATOCRIT % 39.4   PLATELETS Thousands/uL 177     Results from last 7 days   Lab Units 06/01/24  1931   POTASSIUM mmol/L 3.9   CHLORIDE mmol/L 107   CO2 mmol/L 23   BUN mg/dL 13   CREATININE mg/dL 0.99   CALCIUM mg/dL 8.4     Results from last 7 days   Lab Units 06/01/24  1931   INR  0.94     @LABRCNTI(mg)@      Imaging: I have personally reviewed pertinent reports.      CT stroke alert brain    Result Date: 6/1/2024  Narrative: CT BRAIN - STROKE ALERT PROTOCOL INDICATION:   Stroke Alert. COMPARISON:  None. TECHNIQUE:  CT examination of the brain was performed.  In addition to axial images, coronal reformatted images " were created and submitted for interpretation. Radiation dose length product (DLP) for this visit:  888 mGy-cm .  This examination, like all CT scans performed in the [facility], was performed utilizing techniques to minimize radiation dose exposure, including the use of iterative reconstruction and automated exposure control. IMAGE QUALITY:  Diagnostic. FINDINGS: PARENCHYMA:  No intracranial mass, mass effect or midline shift. No CT signs of acute infarction.  No acute parenchymal hemorrhage. Normal intracranial vasculature. VENTRICLES AND EXTRA-AXIAL SPACES:  Normal for the patient's age. VISUALIZED ORBITS: Normal visualized orbits. PARANASAL SINUSES: There is mucosal thickening. CALVARIUM AND EXTRACRANIAL SOFT TISSUES:   Normal.     Impression: No CT evidence for large acute vascular distribution infarct or acute intracranial hemorrhage. Findings were directly discussed with Nicolas Meza at approximately 7:40 p.m. Workstation performed: [id number]     CTA stroke alert (head/neck)    Result Date: 6/1/2024  Narrative: CTA NECK AND BRAIN WITH CONTRAST INDICATION: Stroke Alert COMPARISON:   None. TECHNIQUE:   Post contrast imaging was performed after administration of iodinated contrast through the neck and brain. Post contrast axial 0.625 mm images timed to opacify the arterial system. 3D rendering was performed on an independent workstation.   MIP reconstructions performed. Coronal reconstructions were performed of the noncontrast portion of the brain. Radiation dose length product (DLP) for this visit:  499 mGy-cm .  This examination, like all CT scans performed in the [facility], was performed utilizing techniques to minimize radiation dose exposure, including the use of iterative reconstruction and automated exposure control. IV Contrast:  100 mL of iohexol (OMNIPAQUE) IMAGE QUALITY:   Slightly limited secondary to suboptimal bolus and streak artifact. FINDINGS: CERVICAL  VASCULATURE AORTIC ARCH AND GREAT VESSELS:  Mild atherosclerotic disease of the arch, proximal great vessels and visualized subclavian vessels.  No significant stenosis. RIGHT VERTEBRAL ARTERY CERVICAL SEGMENT: There is atherosclerotic plaque at the origin of the right vertebral artery with mild narrowing. The vessel is normal in caliber throughout the neck. LEFT VERTEBRAL ARTERY CERVICAL SEGMENT:  Normal origin. The vessel is normal in caliber throughout the neck. RIGHT EXTRACRANIAL CAROTID SEGMENT: Mild atherosclerotic disease of the distal common carotid artery and proximal cervical internal carotid artery without significant stenosis compared to the more distal ICA. LEFT EXTRACRANIAL CAROTID SEGMENT: Mild atherosclerotic disease of the distal common carotid artery and proximal cervical internal carotid artery without significant stenosis compared to the more distal ICA. NASCET criteria was used to determine the degree of internal carotid artery diameter stenosis. INTRACRANIAL VASCULATURE INTERNAL CAROTID ARTERIES: Atherosclerotic plaque along the cavernous segments of the internal carotid arteries without high-grade stenosis or focal occlusion. ANTERIOR CIRCULATION:  Symmetric A1 segments and anterior cerebral arteries with normal enhancement.  Normal anterior communicating artery. MIDDLE CEREBRAL ARTERY CIRCULATION:  M1 segment and middle cerebral artery branches demonstrate normal enhancement bilaterally. DISTAL VERTEBRAL ARTERIES:  Normal distal vertebral arteries.  Posterior inferior cerebellar artery origins are normal. Normal vertebral basilar junction. BASILAR ARTERY:  Basilar artery is normal in caliber.  Normal superior cerebellar arteries. POSTERIOR CEREBRAL ARTERIES: Both posterior cerebral arteries arises from the basilar tip.  Both arteries demonstrate normal enhancement.   Normal posterior communicating arteries. VENOUS STRUCTURES:  Normal. NON VASCULAR ANATOMY BONY STRUCTURES:  No acute osseous  abnormality. SOFT TISSUES OF THE NECK:  Normal. THORACIC INLET:  Unremarkable.     Impression: No evidence for high-grade stenosis or major branch vessel occlusion of the cervical or intracranial vasculature. Findings were directly discussed with Nicolas Meza at 7:40 p.m. Workstation performed: [id number]         Medical decision making: High  Diagnosis addressed: CVA with right-sided facial droop and right-sided weakness, this would be a injury posing a threat to life or bodily function  Data:   Reviewed  CBC, CMP, INR, troponin, viral panel, CT of the head and CTA of the head           Ordered CBC, BMP, MRI of the brain, echocardiogram, lipid panel, A1c            Reviewed external notes from from PCP back in 2022, has not seen a provider since            I myself interpreted the EKG            Discussion with ER provider for admission to the hospital due to CVA and ongoing right-sided facial droop.    Discussed management with the ER provider as a discussion was had with neurology, patient be loaded with aspirin and Plavix and continue stroke pathway  Risk: Prescription drug management aspirin and statin                   Epic Records Reviewed as well as Records in Care Everywhere    ** Please Note: Dragon 360 Dictation voice to text software was used in the creation of this document. **

## 2024-06-02 NOTE — PLAN OF CARE
Problem: PAIN - ADULT  Goal: Verbalizes/displays adequate comfort level or baseline comfort level  Description: Interventions:  - Encourage patient to monitor pain and request assistance  - Assess pain using appropriate pain scale  - Administer analgesics based on type and severity of pain and evaluate response  - Implement non-pharmacological measures as appropriate and evaluate response  - Consider cultural and social influences on pain and pain management  - Notify physician/advanced practitioner if interventions unsuccessful or patient reports new pain  Outcome: Progressing     Problem: INFECTION - ADULT  Goal: Absence or prevention of progression during hospitalization  Description: INTERVENTIONS:  - Assess and monitor for signs and symptoms of infection  - Monitor lab/diagnostic results  - Monitor all insertion sites, i.e. indwelling lines, tubes, and drains  - Monitor endotracheal if appropriate and nasal secretions for changes in amount and color  - [place] appropriate cooling/warming therapies per order  - Administer medications as ordered  - Instruct and encourage patient and family to use good hand hygiene technique  - Identify and instruct in appropriate isolation precautions for identified infection/condition  Outcome: Progressing  Goal: Absence of fever/infection during neutropenic period  Description: INTERVENTIONS:  - Monitor WBC    Outcome: Progressing     Problem: SAFETY ADULT  Goal: Patient will remain free of falls  Description: INTERVENTIONS:  - Educate patient/family on patient safety including physical limitations  - Instruct patient to call for assistance with activity   - Consult OT/PT to assist with strengthening/mobility   - Keep Call bell within reach  - Keep bed low and locked with side rails adjusted as appropriate  - Keep care items and personal belongings within reach  - Initiate and maintain comfort rounds  - Make Fall Risk Sign visible to staff  - Apply yellow socks and bracelet  for high fall risk patients  - Consider moving patient to room near nurses station  Outcome: Progressing  Goal: Maintain or return to baseline ADL function  Description: INTERVENTIONS:  -  Assess patient's ability to carry out ADLs; assess patient's baseline for ADL function and identify physical deficits which impact ability to perform ADLs (bathing, care of mouth/teeth, toileting, grooming, dressing, etc.)  - Assess/evaluate cause of self-care deficits   - Assess range of motion  - Assess patient's mobility; develop plan if impaired  - Assess patient's need for assistive devices and provide as appropriate  - Encourage maximum independence but intervene and supervise when necessary  - Involve family in performance of ADLs  - Assess for home care needs following discharge   - Consider OT consult to assist with ADL evaluation and planning for discharge  - Provide patient education as appropriate  Outcome: Progressing  Goal: Maintains/Returns to pre admission functional level  Description: INTERVENTIONS:  - Perform AM-PAC 6 Click Basic Mobility/ Daily Activity assessment daily.  - Set and communicate daily mobility goal to care team and patient/family/caregiver.   - Collaborate with rehabilitation services on mobility goals if consulted  - Perform Range of Motion 3 times a day.  - Reposition patient every 2 hours.  - Dangle patient 3 times a day  - Stand patient 3 times a day  - Ambulate patient 3 times a day  - Out of bed to chair 3 times a day   - Out of bed for meals 3 times a day  - Out of bed for toileting  - Record patient progress and toleration of activity level   Outcome: Progressing     Problem: DISCHARGE PLANNING  Goal: Discharge to home or other facility with appropriate resources  Description: INTERVENTIONS:  - Identify barriers to discharge w/patient and caregiver  - Arrange for needed discharge resources and transportation as appropriate  - Identify discharge learning needs (meds, wound care, etc.)  -  Arrange for interpretive services to assist at discharge as needed  - Refer to Case Management Department for coordinating discharge planning if the patient needs post-hospital services based on physician/advanced practitioner order or complex needs related to functional status, cognitive ability, or social support system  Outcome: Progressing     Problem: Knowledge Deficit  Goal: Patient/family/caregiver demonstrates understanding of disease process, treatment plan, medications, and discharge instructions  Description: Complete learning assessment and assess knowledge base.  Interventions:  - Provide teaching at level of understanding  - Provide teaching via preferred learning methods  Outcome: Progressing     Problem: Neurological Deficit  Goal: Neurological status is stable or improving  Description: Interventions:  - Monitor and assess patient's level of consciousness, motor function, sensory function, and level of assistance needed for ADLs.   - Monitor and report changes from baseline. Collaborate with interdisciplinary team to initiate plan and implement interventions as ordered.   - Provide and maintain a safe environment.  - Consider seizure precautions.  - Consider fall precautions.  - Consider aspiration precautions.  - Consider bleeding precautions.  Outcome: Progressing     Problem: Activity Intolerance/Impaired Mobility  Goal: Mobility/activity is maintained at optimum level for patient  Description: Interventions:  - Assess and monitor patient  barriers to mobility and need for assistive/adaptive devices.  - Assess patient's emotional response to limitations.  - Collaborate with interdisciplinary team and initiate plans and interventions as ordered.  - Encourage independent activity per ability.  - Maintain proper body alignment.  - Perform active/passive rom as tolerated/ordered.  - Plan activities to conserve energy.  - Turn patient as appropriate  Outcome: Progressing     Problem: Communication  Impairment  Goal: Ability to express needs and understand communication  Description: Assess patient's communication skills and ability to understand information.  Patient will demonstrate use of effective communication techniques, alternative methods of communication and understanding even if not able to speak.     - Encourage communication and provide alternate methods of communication as needed.  - Collaborate with case management/ for discharge needs.  - Include patient/family/caregiver in decisions related to communication.  Outcome: Progressing     Problem: Potential for Aspiration  Goal: Non-ventilated patient's risk of aspiration is minimized  Description: Assess and monitor vital signs, respiratory status, and labs (WBC).  Monitor for signs of aspiration (tachypnea, cough, rales, wheezing, cyanosis, fever).    - Assess and monitor patient's ability to swallow.  - Place patient up in chair to eat if possible.  - HOB up at 90 degrees to eat if unable to get patient up into chair.  - Supervise patient during oral intake.   - Instruct patient/ family to take small bites.  - Instruct patient/ family to take small single sips when taking liquids.  - Follow patient-specific strategies generated by speech pathologist.  Outcome: Progressing  Goal: Ventilated patient's risk of aspiration is minimized  Description: Assess and monitor vital signs, respiratory status, airway cuff pressure, and labs (WBC).  Monitor for signs of aspiration (tachypnea, cough, rales, wheezing, cyanosis, fever).    - Elevate head of bed 30 degrees if patient has tube feeding.  - Monitor tube feeding.  Outcome: Progressing     Problem: Nutrition  Goal: Nutrition/Hydration status is improving  Description: Monitor and assess patient's nutrition/hydration status for malnutrition (ex- brittle hair, bruises, dry skin, pale skin and conjunctiva, muscle wasting, smooth red tongue, and disorientation). Collaborate with  interdisciplinary team and initiate plan and interventions as ordered.  Monitor patient's weight and dietary intake as ordered or per policy. Utilize nutrition screening tool and intervene per policy. Determine patient's food preferences and provide high-protein, high-caloric foods as appropriate.     - Assist patient with eating.  - Allow adequate time for meals.  - Encourage patient to take dietary supplement as ordered.  - Collaborate with clinical nutritionist.  - Include patient/family/caregiver in decisions related to nutrition.  Outcome: Progressing     Problem: NEUROSENSORY - ADULT  Goal: Achieves stable or improved neurological status  Description: INTERVENTIONS  - Monitor and report changes in neurological status  - Monitor vital signs such as temperature, blood pressure, glucose, and any other labs ordered   - Initiate measures to prevent increased intracranial pressure  - Monitor for seizure activity and implement precautions if appropriate      Outcome: Progressing  Goal: Remains free of injury related to seizures activity  Description: INTERVENTIONS  - Maintain airway, patient safety  and administer oxygen as ordered  - Monitor patient for seizure activity, document and report duration and description of seizure to physician/advanced practitioner  - If seizure occurs,  ensure patient safety during seizure  - Reorient patient post seizure  - Seizure pads on all 4 side rails  - Instruct patient/family to notify RN of any seizure activity including if an aura is experienced  - Instruct patient/family to call for assistance with activity based on nursing assessment  - Administer anti-seizure medications if ordered    Outcome: Progressing  Goal: Achieves maximal functionality and self care  Description: INTERVENTIONS  - Monitor swallowing and airway patency with patient fatigue and changes in neurological status  - Encourage and assist patient to increase activity and self care.   - Encourage visually  impaired, hearing impaired and aphasic patients to use assistive/communication devices  Outcome: Progressing     Problem: CARDIOVASCULAR - ADULT  Goal: Maintains optimal cardiac output and hemodynamic stability  Description: INTERVENTIONS:  - Monitor I/O, vital signs and rhythm  - Monitor for S/S and trends of decreased cardiac output  - Administer and titrate ordered vasoactive medications to optimize hemodynamic stability  - Assess quality of pulses, skin color and temperature  - Assess for signs of decreased coronary artery perfusion  - Instruct patient to report change in severity of symptoms  Outcome: Progressing  Goal: Absence of cardiac dysrhythmias or at baseline rhythm  Description: INTERVENTIONS:  - Continuous cardiac monitoring, vital signs, obtain 12 lead EKG if ordered  - Administer antiarrhythmic and heart rate control medications as ordered  - Monitor electrolytes and administer replacement therapy as ordered  Outcome: Progressing     Problem: RESPIRATORY - ADULT  Goal: Achieves optimal ventilation and oxygenation  Description: INTERVENTIONS:  - Assess for changes in respiratory status  - Assess for changes in mentation and behavior  - Position to facilitate oxygenation and minimize respiratory effort  - Oxygen administered by appropriate delivery if ordered  - Initiate smoking cessation education as indicated  - Encourage broncho-pulmonary hygiene including cough, deep breathe, Incentive Spirometry  - Assess the need for suctioning and aspirate as needed  - Assess and instruct to report SOB or any respiratory difficulty  - Respiratory Therapy support as indicated  Outcome: Progressing     Problem: METABOLIC, FLUID AND ELECTROLYTES - ADULT  Goal: Electrolytes maintained within normal limits  Description: INTERVENTIONS:  - Monitor labs and assess patient for signs and symptoms of electrolyte imbalances  - Administer electrolyte replacement as ordered  - Monitor response to electrolyte replacements,  including repeat lab results as appropriate  - Instruct patient on fluid and nutrition as appropriate  Outcome: Progressing  Goal: Fluid balance maintained  Description: INTERVENTIONS:  - Monitor labs   - Monitor I/O and WT  - Instruct patient on fluid and nutrition as appropriate  - Assess for signs & symptoms of volume excess or deficit  Outcome: Progressing  Goal: Glucose maintained within target range  Description: INTERVENTIONS:  - Monitor Blood Glucose as ordered  - Assess for signs and symptoms of hyperglycemia and hypoglycemia  - Administer ordered medications to maintain glucose within target range  - Assess nutritional intake and initiate nutrition service referral as needed  Outcome: Progressing     Problem: HEMATOLOGIC - ADULT  Goal: Maintains hematologic stability  Description: INTERVENTIONS  - Assess for signs and symptoms of bleeding or hemorrhage  - Monitor labs  - Administer supportive blood products/factors as ordered and appropriate  Outcome: Progressing     Problem: MUSCULOSKELETAL - ADULT  Goal: Maintain or return mobility to safest level of function  Description: INTERVENTIONS:  - Assess patient's ability to carry out ADLs; assess patient's baseline for ADL function and identify physical deficits which impact ability to perform ADLs (bathing, care of mouth/teeth, toileting, grooming, dressing, etc.)  - Assess/evaluate cause of self-care deficits   - Assess range of motion  - Assess patient's mobility  - Assess patient's need for assistive devices and provide as appropriate  - Encourage maximum independence but intervene and supervise when necessary  - Involve family in performance of ADLs  - Assess for home care needs following discharge   - Consider OT consult to assist with ADL evaluation and planning for discharge  - Provide patient education as appropriate  Outcome: Progressing  Goal: Maintain proper alignment of affected body part  Description: INTERVENTIONS:  - Support, maintain and  protect limb and body alignment  - Provide patient/ family with appropriate education  Outcome: Progressing

## 2024-06-02 NOTE — ASSESSMENT & PLAN NOTE
Patient with a right-sided facial droop and arm and leg weakness.  CT head and CTA without abnormality    Loaded with aspirin and Plavix  Continue aspirin 81 mg daily  Consult neurology  MRI of the brain  Echocardiogram  Obtain lipid panel A1c in the morning  CBC, BMP in the morning

## 2024-06-02 NOTE — UTILIZATION REVIEW
Initial Clinical Review    Admission: Date/Time/Statement:   Admission Orders (From admission, onward)       Ordered        06/01/24 2013  INPATIENT ADMISSION  Once                          Orders Placed This Encounter   Procedures    INPATIENT ADMISSION     Standing Status:   Standing     Number of Occurrences:   1     Order Specific Question:   Level of Care     Answer:   Med Surg [16]     Order Specific Question:   Estimated length of stay     Answer:   More than 2 Midnights     Order Specific Question:   Certification     Answer:   I certify that inpatient services are medically necessary for this patient for a duration of greater than two midnights. See H&P and MD Progress Notes for additional information about the patient's course of treatment.     ED Arrival Information       Expected   -    Arrival   6/1/2024 19:12    Acuity   Emergent              Means of arrival   Ambulance    Escorted by   [facility]   Hospitalist    Admission type   Emergency              Arrival complaint   stroke alert             Chief Complaint   Patient presents with    STROKE Alert     Pre hosptial stroke alert called at 1903. Last known well was 5am. Woke up at 1pm not feeling right. 5pm noticed right facial droop, right arm/leg heaviness.        Initial Presentation: 48 y.o. male presents to ED via  EMS  from home  with erick arm and leg weakness  and right facial droop. Woke at  0500 the morning of admission in normal state, went back to sleep, woke  about  1  pm not feeling  quite right.  Tooke  tylenol and went to work.  Had some weakness  right upper and lower extremities  at work. Co workers noticed a right sided facial droop, called  EMS.  Has not seen a  physician in 5 years,  vapes and  uses  marijuana. Was  on methadone previously.  Has pins and needles sensation  RUE,   had difficulty  writing his name at work.  Moves all extremities in ED, but right sided facial droop noted.   Ct head and CTA   unremarkable.  Admit  Ip with Stroke like   symptoms, anxiety and plan is  neuro consult, neuro checks, MRI brain,  PT/OT/speech,2 DE, aspirin and plavix.    Neuro note  NIHSS   1   Not  a  candidate  for thrombolytic,  symptoms resolved, no disabling.   Moitor on tele.  Wait  Mri brain.         ED Triage Vitals   Temperature Pulse Respirations Blood Pressure SpO2   06/01/24 1915 06/01/24 1915 06/01/24 1915 06/01/24 1915 06/01/24 1915   98.7 °F (37.1 °C) (!) 111 18 (!) 173/76 97 %      Temp Source Heart Rate Source Patient Position - Orthostatic VS BP Location FiO2 (%)   06/01/24 1915 06/01/24 1915 06/01/24 1915 06/01/24 2030 --   Temporal Monitor Lying Left arm       Pain Score       06/01/24 2030       No Pain          Wt Readings from Last 1 Encounters:   06/01/24 119 kg (261 lb 9.6 oz)     Additional Vital Signs:     97.5 °F (36.4 °C) 64 20 124/75 91 94 % None (Room air) Lying    06/02/24 0130 97.5 °F (36.4 °C) 60 18 122/76 91 94 % None (Room air) Lying   06/01/24 2330 97.2 °F (36.2 °C) Abnormal  66 18 115/56 76 93 % None (Room air) Lying   06/01/24 2230 97.7 °F (36.5 °C) 62 18 124/55 78 94 % None (Room air) Lying   06/01/24 2130 97.7 °F (36.5 °C) 69 20 123/80 94 96 % None (Room air) Lying   06/01/24 20:35:11 97.7 °F (36.5 °C) 71 18 117/81 93 96 % -- --   06/01/24 2030 97.7 °F (36.5 °C) 74 20 117/81 93 96 % None (Room air) Lying   06/01/24 2000 -- 91 20 164/70 -- 96 % None (Room air) Lying   06/01/24 1945 -- 99 18 169/72 -- 96 % None (Room air) Lying   06/01/24 1930 -- 100 20 175/79 Abnormal  -- 98 % None (Room air) Lying   06/01/24 1915 98.7 °F (37.1 °C) 111 Abnormal  18 173/76 Abnormal  -- 97 % None (Room air) Lying     Pertinent Labs/Diagnostic Test Results:   CTA stroke alert (head/neck)   Final Result by Bull Celeste MD (06/01 1941)      No evidence for high-grade stenosis or major branch vessel occlusion of the cervical or intracranial vasculature.            Findings were directly discussed with Nicolas  Susana at 7:40 p.m.                           Workstation performed: [id number]         CT stroke alert brain   Final Result by Bull Celeste MD (06/01 1942)      No CT evidence for large acute vascular distribution infarct or acute intracranial hemorrhage.      Findings were directly discussed with Nicolas Meza at approximately 7:40 p.m.      Workstation performed: [id number]         MRI Inpatient Order    (Results Pending)     Results from last 7 days   Lab Units 06/01/24  1931   SARS-COV-2  Negative     Results from last 7 days   Lab Units 06/02/24  0537 06/01/24  1931   WBC Thousand/uL 4.69 5.52   HEMOGLOBIN g/dL 13.6 13.4   HEMATOCRIT % 40.7 39.4   PLATELETS Thousands/uL 162 177         Results from last 7 days   Lab Units 06/02/24 0537 06/01/24  1931   SODIUM mmol/L 137 137   POTASSIUM mmol/L 4.0 3.9   CHLORIDE mmol/L 110* 107   CO2 mmol/L 23 23   ANION GAP mmol/L 4 7   BUN mg/dL 10 13   CREATININE mg/dL 0.87 0.99   EGFR ml/min/1.73sq m 102 89   CALCIUM mg/dL 8.6 8.4         Results from last 7 days   Lab Units 06/01/24  1916   POC GLUCOSE mg/dl 132     Results from last 7 days   Lab Units 06/02/24 0537 06/01/24  1931   GLUCOSE RANDOM mg/dL 90 99               Results from last 7 days   Lab Units 06/01/24  1931   HS TNI 0HR ng/L <2         Results from last 7 days   Lab Units 06/01/24  1931   PROTIME seconds 12.9   INR  0.94   PTT seconds 29               Results from last 7 days   Lab Units 06/01/24  1931   INFLUENZA A PCR  Negative   INFLUENZA B PCR  Negative   RSV PCR  Negative             ED Treatment:   Medication Administration from 06/01/2024 1912 to 06/01/2024 2028         Date/Time Order Dose Route Action Comments     06/01/2024 1926 EDT iohexol (OMNIPAQUE) 350 MG/ML injection (SINGLE-DOSE) 100 mL 100 mL Intravenous Given --     06/01/2024 1955 EDT aspirin chewable tablet 324 mg 324 mg Oral Given --     06/01/2024 1956 EDT clopidogrel (PLAVIX) tablet 300 mg 300 mg Oral Given --            Present  on Admission:   Stroke-like symptoms   Vaping nicotine dependence, non-tobacco product   Anxiety   Marijuana use   Obesity, Class III, BMI 40-49.9 (morbid obesity) ([facility])      Admitting Diagnosis: Facial droop [R29.810]  CVA (cerebral vascular accident) ([facility]) [I63.9]  Right sided weakness [R53.1]  National Institutes of Health (NIH) Stroke Scale level of consciousness score 0, alert; keenly responsive [Z78.9]  Age/Sex: 48 y.o. male  Admission Orders:  Scheduled Medications:  aspirin, 81 mg, Oral, Daily  atorvastatin, 40 mg, Oral, QPM  enoxaparin, 40 mg, Subcutaneous, Daily  nicotine, 14 mg, Transdermal, Daily      Continuous IV Infusions:     PRN Meds:  acetaminophen, 650 mg, Oral, Q6H PRN    24 hr tele  Dysphagia  eval  Stroke teaching  Neuro checks    IP CONSULT TO NEUROLOGY  IP CONSULT TO NEUROLOGY  IP CONSULT TO CASE MANAGEMENT  IP CONSULT TO NUTRITION SERVICES    Network Utilization Review Department  ATTENTION: Please call with any questions or concerns to [phone] and carefully listen to the prompts so that you are directed to the right person. All voicemails are confidential.   For Discharge needs, contact Care Management DC Support Team at [phone] opt. [phone]  Send all requests for admission clinical reviews, approved or denied determinations and any other requests to dedicated fax number below belonging to the campus where the patient is receiving treatment. List of dedicated fax numbers for the Facilities:  FACILITY NAME UR FAX NUMBER   ADMISSION DENIALS (Administrative/Medical Necessity) [phone]   DISCHARGE SUPPORT TEAM (NETWORK) [phone]   PARENT CHILD HEALTH (Maternity/NICU/Pediatrics) [phone]   [facility] [phone]   [facility] [phone]   [facility] [phone]   [facility] [phone]   [facility] [phone]   [facility]  [place] [phone]   [facility] [phone]   [facility] [phone]   [facility] [phone]   [facility] [phone]   [facility] [phone]   [facility] [phone]

## 2024-06-02 NOTE — PHYSICAL THERAPY NOTE
Physical Therapy Screen    Patient Name: Gregor Marrero    Today's Date: 6/2/2024     Problem List  Principal Problem:    Stroke-like symptoms  Active Problems:    Vaping nicotine dependence, non-tobacco product    Anxiety    Marijuana use    Obesity, Class III, BMI 40-49.9 (morbid obesity) (HCC)       Past Medical History  Past Medical History:   Diagnosis Date    No known problems     Substance abuse (HCC)         Past Surgical History  Past Surgical History:   Procedure Laterality Date    NO PAST SURGERIES      OTHER SURGICAL HISTORY      right armsurgery deeplaceration           06/02/24 0828   PT Last Visit   PT Visit Date 06/02/24   Note Type   Note type Screen         Received order for PT consult. Chart reviewed. Pt admitted with diagnosis stroke-like symptoms. Spoke with RN, Yumiko who reports patient is independent in room. Spoke with patient who reports he is at his PLOF of independent at this time. He reports ambulating in room without difficulty. Upon arrival to room, patient pacing in room reporting he is feeling anxious. Observed ambulating without difficulty. Pt reports had difficulty lifting left LE day prior but that has resolved. Pt c/o right sided face weakness and right UE weakness however not affecting functional mobility as pt was able to grasp drink with right hand and take pill without difficulty. Pt reports no concerns with returning home upon discharge. No concerns with dressing. Will D/C PT services at this time as patient is at his PLOF of independent without acute care PT services warranted. Should patient's status change, please re-consult. Pt concerned about facial muscles and right UE. Provided handout for OP services at [facility]. Pt aware of freedom of choice.     Shaista De La Cruz, PT,DPT

## 2024-06-02 NOTE — CONSULTS
TeleConsultation - Stroke   Gregor Marrero 48 y.o. male MRN: [id number]  Unit/Bed#: -01 Encounter: [id number]      VIRTUAL CARE DOCUMENTATION:     1. This service was provided via Telemedicine using [employer]     2. Parties in the room with patient during teleconsult Family member: wife     3. Confidentiality My office door was closed     4. Participants No one else was in the room    5. Patient acknowledged consent and understanding of privacy and security of the  Telemedicine consult. I informed the patient that I have reviewed their record in Epic and presented the opportunity for them to ask any questions regarding the visit today.  The patient agreed to participate.    6. Time spent 26 minutes     Assessment & Plan   Gregor Marrero is a 48 year old man with PMHx as noted below who presented to the hospital for evaluation of right facial weakness and RUE/RLE weakness. On further discussion, pt reported stiffness/pain in the right back and RUE/RLE mainly in the right knee and elbow; however, wife and co-worker additionally noted mild right lower facial weakness and he was having issues with right hand  and coordination. Symptoms mostly resolved, continues to report mild weakness of the RUE. No known PMHx other than prior substance abuse; however, does not see doctors. Will need to workup for stroke. Not on any medications at home. Loaded with aspirin and Plavix in the ED.     CT head: no acute intracranial abnormality  CTA H/N: no obvious LVO or high-grade stenosis. Some limitation within study due to artifact and suboptimal contrast bolus especially when assessing right vertebral artery (cervical segment) although noted to have mild narrowing at the origin but otherwise patent vessel.   LDL 95  TSH 1.424  HbA1c pending  TTE pending    Plan:  Acute ischemic stroke protocol  Aspirin 81 mg daily  Plavix 75 mg daily  Tentative plan to continue DAPT x21 days then aspirin monotherapy, pending MRI brain  and further workup  Atorvastatin 40 mg daily  MRI brain without contrast   TTE  Telemetry  HbA1c  Secondary stroke risk factor modification  Permissive hypertension with max of  systolic today, then gradually decrease BP goal over the coming days until BP goal normotension  Goal of normothermia and euglycemia   PT/OT/ST  Stroke Education  Frequent neurological checks  Stat CT head with worsening in neuro exam  Notify neurology with any changes in exam  Rest of care per primary     Gregor Marrero will need follow up in in 6 weeks with neurovascular attending or advance practitioner. He will not require outpatient neurological testing.    History of Present Illness   Reason for Consult / Principal Problem: right-sided weakness  Hx and PE limited by: telemedicine  HPI: Gregor Marrero is a 48 y.o. man with no reported PMHx other than prior substance use who presented to the hospital for evaluation of right-sided weakness.    Per chart review, pt presented to the hospital for evaluation of weakness of the right/face/arm. Last known normal was around 5 AM prior to going to sleep. He woke up around 1 PM and did not feel right but could specific symptoms. While at work, he noticed issues with using his right hand to write with a pen. He additionally felt that his right arm and leg were weak. His co-workers noted right facial weakness, and he felt that his face was not moving correctly when he was talking. BY the time he arrived to the ED, he was only noted to have a slight right facial droop. CT head was without acute findings. CTA H/N with no LVO or high-grade stenosis. He was given an aspirin and Plavix load and admitted for further workup.    On evaluation via telemedicine, he states that he felt that his right side was a bit stiff a couple of nights ago while at work. He came home yesterday morning and fell asleep. He woke up feeling okay but was feeling sore in the right knee and elbow which he felt was limiting  "his mobility in the right arm and leg. Wife additionally mentioned that he was having issues with  strength in the right hand and needed to use his left hand to drive. She also noticed right nasolabial fold flattening. He went back to work last night and a co-worker noted that the right side of his face seemed different than the left. He then came to the hospital for further evaluation. Today, he feels that his right arm is \"90% back to normal\", R leg is at baseline. Wife does not notice much R facial weakness today, appears close to his baseline. Denies having sensory changes, syncope, tremor, aphasia, dysarthria, dysphagia, HA, vision changes, N/V. Not on any medications at home. History of smoking with a vape, occasional alcohol use, remote drug use but has been clean for about 5 years. No other noted issues.     Inpatient consult to Neurology  Consult performed by: Curt Long MD  Consult ordered by: Apolinar Mckeon MD        Review of Systems  See HPI     Historical Information   Past Medical History:   Diagnosis Date    No known problems     Substance abuse (HCC)      Past Surgical History:   Procedure Laterality Date    NO PAST SURGERIES      OTHER SURGICAL HISTORY      right armsurgery deeplaceration     Social History   Social History     Substance and Sexual Activity   Alcohol Use Not Currently     Social History     Substance and Sexual Activity   Drug Use Yes    Types: Marijuana    Comment: medica lmarijuana     E-Cigarette/Vaping    E-Cigarette Use Current Every Day User      E-Cigarette/Vaping Substances    Nicotine Yes      Social History     Tobacco Use   Smoking Status Former    Current packs/day: 0.50    Types: Cigarettes   Smokeless Tobacco Never     Meds/Allergies   current meds:   Current Facility-Administered Medications   Medication Dose Route Frequency    acetaminophen (TYLENOL) tablet 650 mg  650 mg Oral Q6H PRN    aspirin chewable tablet 81 mg  81 mg Oral Daily    atorvastatin " "(LIPITOR) tablet 40 mg  40 mg Oral QPM    enoxaparin (LOVENOX) subcutaneous injection 40 mg  40 mg Subcutaneous Daily    nicotine (NICODERM CQ) 14 mg/24hr TD 24 hr patch 14 mg  14 mg Transdermal Daily    and PTA meds:   Prior to Admission Medications   Prescriptions Last Dose Informant Patient Reported? Taking?   methadone (DOLOPHINE) 10 mg tablet Not Taking Self Yes No   Sig: Take by mouth every 6 (six) hours as needed for moderate pain ,    Patient not taking: Reported on 5/16/2022,      Facility-Administered Medications: None     No Known Allergies    Objective   Vitals:Blood pressure (!) 181/106, pulse 81, temperature 97.7 °F (36.5 °C), temperature source Temporal, resp. rate 18, height 5' 6\" (1.676 m), weight 119 kg (261 lb 9.6 oz), SpO2 94%.,Body mass index is 42.22 kg/m².  No intake or output data in the 24 hours ending 06/02/24 1125    Invasive Devices:   Invasive Devices       Peripheral Intravenous Line  Duration             Peripheral IV 06/01/24 Proximal;Right;Ventral (anterior) Forearm <1 day                  Physical Exam  Modified physical examination as this is a video consultation:    Gen: NAD.  HEENT: NC/AT, no septal deviation, EOMI  Resp: Symmetric chest rise and patient in no obvious respiratory distress  MSK: ROM normal  Skin: No rash noted in visualized portion of this exam     Neurologic exam:  Mental status: awake, alert and oriented x3, no aphasia or dysarthria noted. Patient is able to follow 2 and 3 step commands with ease. Naming/repetition/comprehension intact. Attention/concentration intact.  Cranial nerves: PERRL, EOMI, no reported differences in sensation to light touch in V1-V3 b/l, no obvious facial droop or obvious facial asymmetry, tongue midline  Motor: intact antigravity x4 extremities, no pronator drift noted  Sensation: intact to light touch, instructed patient on how to do this in all extremities proximal and distal  Cerebellar: no dysmetria b/l with FNF testing or HTS " "testing  Gait: ambulating in room without apparent ataxia    NIHSS:  1a.Level of Consciousness: 0 = Alert   1b. LOC Questions: 0 = Answers both correctly   1c. LOC Commands: 0 = Obeys both correctly   2. Best Gaze: 0 = Normal   3. Visual: 0 = No visual field loss   4. Facial Palsy: 0=Normal symmetric movement   5a. Motor Right Arm: 0=No drift, limb holds 90 (or 45) degrees for full 10 seconds   5b. Motor Left Arm: 0=No drift, limb holds 90 (or 45) degrees for full 10 seconds   6a. Motor Right Le=No drift, limb holds 90 (or 45) degrees for full 10 seconds   6b. Motor Left Le=No drift, limb holds 90 (or 45) degrees for full 10 seconds   7. Limb Ataxia:  0=Absent   8. Sensory: 0=Normal; no sensory loss   9. Best Language:  0=No aphasia, normal   10. Dysarthria: 0=Normal articulation   11. Extinction and Inattention (formerly Neglect): 0=No abnormality   Total Score: 0   Time NIHSS was completed: 1158    Modified [place] Score:  0 (No baseline symptoms/disability)    Lab Results: CBC:   Results from last 7 days   Lab Units 24  0537 24   WBC Thousand/uL 4.69 5.52   RBC Million/uL 4.57 4.49   HEMOGLOBIN g/dL 13.6 13.4   HEMATOCRIT % 40.7 39.4   MCV fL 89 88   PLATELETS Thousands/uL 162 177   , BMP/CMP:   Results from last 7 days   Lab Units 2437 24   SODIUM mmol/L 137 137   POTASSIUM mmol/L 4.0 3.9   CHLORIDE mmol/L 110* 107   CO2 mmol/L 23 23   BUN mg/dL 10 13   CREATININE mg/dL 0.87 0.99   CALCIUM mg/dL 8.6 8.4   EGFR ml/min/1.73sq m 102 89   , HgBA1C:   , TSH:   Results from last 7 days   Lab Units 24  05   TSH 3RD GENERATON uIU/mL 1.424   , Coagulation:   Results from last 7 days   Lab Units 24   INR  0.94   , Lipid Profile:   Results from last 7 days   Lab Units 24  05   HDL mg/dL 39*   LDL CALC mg/dL 95   TRIGLYCERIDES mg/dL 136   , Urinalysis:       Invalid input(s): \"URIBILINOGEN\"  Imaging Studies: I have personally reviewed pertinent " reports.   and I have personally reviewed pertinent films in PACS    Code Status: Level 1 - Full Code

## 2024-06-02 NOTE — PLAN OF CARE
Problem: PAIN - ADULT  Goal: Verbalizes/displays adequate comfort level or baseline comfort level  Description: Interventions:  - Encourage patient to monitor pain and request assistance  - Assess pain using appropriate pain scale  - Administer analgesics based on type and severity of pain and evaluate response  - Implement non-pharmacological measures as appropriate and evaluate response  - Consider cultural and social influences on pain and pain management  - Notify physician/advanced practitioner if interventions unsuccessful or patient reports new pain  Outcome: Progressing     Problem: INFECTION - ADULT  Goal: Absence or prevention of progression during hospitalization  Description: INTERVENTIONS:  - Assess and monitor for signs and symptoms of infection  - Monitor lab/diagnostic results  - Monitor all insertion sites, i.e. indwelling lines, tubes, and drains  - Monitor endotracheal if appropriate and nasal secretions for changes in amount and color  - [place] appropriate cooling/warming therapies per order  - Administer medications as ordered  - Instruct and encourage patient and family to use good hand hygiene technique  - Identify and instruct in appropriate isolation precautions for identified infection/condition  Outcome: Progressing  Goal: Absence of fever/infection during neutropenic period  Description: INTERVENTIONS:  - Monitor WBC    Outcome: Progressing     Problem: SAFETY ADULT  Goal: Patient will remain free of falls  Description: INTERVENTIONS:  - Educate patient/family on patient safety including physical limitations  - Instruct patient to call for assistance with activity   - Consult OT/PT to assist with strengthening/mobility   - Keep Call bell within reach  - Keep bed low and locked with side rails adjusted as appropriate  - Keep care items and personal belongings within reach  - Initiate and maintain comfort rounds  - Make Fall Risk Sign visible to staff  - Offer Toileting every 2 Hours,  in advance of need  - Initiate/Maintain alarm  - Obtain necessary fall risk management equipment  - Apply yellow socks and bracelet for high fall risk patients  - Consider moving patient to room near nurses station  Outcome: Progressing  Goal: Maintain or return to baseline ADL function  Description: INTERVENTIONS:  -  Assess patient's ability to carry out ADLs; assess patient's baseline for ADL function and identify physical deficits which impact ability to perform ADLs (bathing, care of mouth/teeth, toileting, grooming, dressing, etc.)  - Assess/evaluate cause of self-care deficits   - Assess range of motion  - Assess patient's mobility; develop plan if impaired  - Assess patient's need for assistive devices and provide as appropriate  - Encourage maximum independence but intervene and supervise when necessary  - Involve family in performance of ADLs  - Assess for home care needs following discharge   - Consider OT consult to assist with ADL evaluation and planning for discharge  - Provide patient education as appropriate  Outcome: Progressing  Goal: Maintains/Returns to pre admission functional level  Description: INTERVENTIONS:  - Perform AM-PAC 6 Click Basic Mobility/ Daily Activity assessment daily.  - Set and communicate daily mobility goal to care team and patient/family/caregiver.   - Collaborate with rehabilitation services on mobility goals if consulted  - Perform Range of Motion 3 times a day.  - Reposition patient every 2 hours.  - Dangle patient 3 times a day  - Stand patient 3 times a day  - Ambulate patient 3 times a day  - Out of bed to chair 3 times a day   - Out of bed for meals 3 times a day  - Out of bed for toileting  - Record patient progress and toleration of activity level   Outcome: Progressing     Problem: DISCHARGE PLANNING  Goal: Discharge to home or other facility with appropriate resources  Description: INTERVENTIONS:  - Identify barriers to discharge w/patient and caregiver  - Arrange  for needed discharge resources and transportation as appropriate  - Identify discharge learning needs (meds, wound care, etc.)  - Arrange for interpretive services to assist at discharge as needed  - Refer to Case Management Department for coordinating discharge planning if the patient needs post-hospital services based on physician/advanced practitioner order or complex needs related to functional status, cognitive ability, or social support system  Outcome: Progressing     Problem: Knowledge Deficit  Goal: Patient/family/caregiver demonstrates understanding of disease process, treatment plan, medications, and discharge instructions  Description: Complete learning assessment and assess knowledge base.  Interventions:  - Provide teaching at level of understanding  - Provide teaching via preferred learning methods  Outcome: Progressing     Problem: Neurological Deficit  Goal: Neurological status is stable or improving  Description: Interventions:  - Monitor and assess patient's level of consciousness, motor function, sensory function, and level of assistance needed for ADLs.   - Monitor and report changes from baseline. Collaborate with interdisciplinary team to initiate plan and implement interventions as ordered.   - Provide and maintain a safe environment.  - Consider seizure precautions.  - Consider fall precautions.  - Consider aspiration precautions.  - Consider bleeding precautions.  Outcome: Progressing     Problem: Activity Intolerance/Impaired Mobility  Goal: Mobility/activity is maintained at optimum level for patient  Description: Interventions:  - Assess and monitor patient  barriers to mobility and need for assistive/adaptive devices.  - Assess patient's emotional response to limitations.  - Collaborate with interdisciplinary team and initiate plans and interventions as ordered.  - Encourage independent activity per ability.  - Maintain proper body alignment.  - Perform active/passive rom as  tolerated/ordered.  - Plan activities to conserve energy.  - Turn patient as appropriate  Outcome: Progressing     Problem: Communication Impairment  Goal: Ability to express needs and understand communication  Description: Assess patient's communication skills and ability to understand information.  Patient will demonstrate use of effective communication techniques, alternative methods of communication and understanding even if not able to speak.     - Encourage communication and provide alternate methods of communication as needed.  - Collaborate with case management/ for discharge needs.  - Include patient/family/caregiver in decisions related to communication.  Outcome: Progressing     Problem: Potential for Aspiration  Goal: Non-ventilated patient's risk of aspiration is minimized  Description: Assess and monitor vital signs, respiratory status, and labs (WBC).  Monitor for signs of aspiration (tachypnea, cough, rales, wheezing, cyanosis, fever).    - Assess and monitor patient's ability to swallow.  - Place patient up in chair to eat if possible.  - HOB up at 90 degrees to eat if unable to get patient up into chair.  - Supervise patient during oral intake.   - Instruct patient/ family to take small bites.  - Instruct patient/ family to take small single sips when taking liquids.  - Follow patient-specific strategies generated by speech pathologist.  Outcome: Progressing  Goal: Ventilated patient's risk of aspiration is minimized  Description: Assess and monitor vital signs, respiratory status, airway cuff pressure, and labs (WBC).  Monitor for signs of aspiration (tachypnea, cough, rales, wheezing, cyanosis, fever).    - Elevate head of bed 30 degrees if patient has tube feeding.  - Monitor tube feeding.  Outcome: Progressing     Problem: Nutrition  Goal: Nutrition/Hydration status is improving  Description: Monitor and assess patient's nutrition/hydration status for malnutrition (ex- brittle  hair, bruises, dry skin, pale skin and conjunctiva, muscle wasting, smooth red tongue, and disorientation). Collaborate with interdisciplinary team and initiate plan and interventions as ordered.  Monitor patient's weight and dietary intake as ordered or per policy. Utilize nutrition screening tool and intervene per policy. Determine patient's food preferences and provide high-protein, high-caloric foods as appropriate.     - Assist patient with eating.  - Allow adequate time for meals.  - Encourage patient to take dietary supplement as ordered.  - Collaborate with clinical nutritionist.  - Include patient/family/caregiver in decisions related to nutrition.  Outcome: Progressing     Problem: NEUROSENSORY - ADULT  Goal: Achieves stable or improved neurological status  Description: INTERVENTIONS  - Monitor and report changes in neurological status  - Monitor vital signs such as temperature, blood pressure, glucose, and any other labs ordered   - Initiate measures to prevent increased intracranial pressure  - Monitor for seizure activity and implement precautions if appropriate      Outcome: Progressing  Goal: Remains free of injury related to seizures activity  Description: INTERVENTIONS  - Maintain airway, patient safety  and administer oxygen as ordered  - Monitor patient for seizure activity, document and report duration and description of seizure to physician/advanced practitioner  - If seizure occurs,  ensure patient safety during seizure  - Reorient patient post seizure  - Seizure pads on all 4 side rails  - Instruct patient/family to notify RN of any seizure activity including if an aura is experienced  - Instruct patient/family to call for assistance with activity based on nursing assessment  - Administer anti-seizure medications if ordered    Outcome: Progressing  Goal: Achieves maximal functionality and self care  Description: INTERVENTIONS  - Monitor swallowing and airway patency with patient fatigue and  changes in neurological status  - Encourage and assist patient to increase activity and self care.   - Encourage visually impaired, hearing impaired and aphasic patients to use assistive/communication devices  Outcome: Progressing     Problem: CARDIOVASCULAR - ADULT  Goal: Maintains optimal cardiac output and hemodynamic stability  Description: INTERVENTIONS:  - Monitor I/O, vital signs and rhythm  - Monitor for S/S and trends of decreased cardiac output  - Administer and titrate ordered vasoactive medications to optimize hemodynamic stability  - Assess quality of pulses, skin color and temperature  - Assess for signs of decreased coronary artery perfusion  - Instruct patient to report change in severity of symptoms  Outcome: Progressing  Goal: Absence of cardiac dysrhythmias or at baseline rhythm  Description: INTERVENTIONS:  - Continuous cardiac monitoring, vital signs, obtain 12 lead EKG if ordered  - Administer antiarrhythmic and heart rate control medications as ordered  - Monitor electrolytes and administer replacement therapy as ordered  Outcome: Progressing     Problem: RESPIRATORY - ADULT  Goal: Achieves optimal ventilation and oxygenation  Description: INTERVENTIONS:  - Assess for changes in respiratory status  - Assess for changes in mentation and behavior  - Position to facilitate oxygenation and minimize respiratory effort  - Oxygen administered by appropriate delivery if ordered  - Initiate smoking cessation education as indicated  - Encourage broncho-pulmonary hygiene including cough, deep breathe, Incentive Spirometry  - Assess the need for suctioning and aspirate as needed  - Assess and instruct to report SOB or any respiratory difficulty  - Respiratory Therapy support as indicated  Outcome: Progressing     Problem: METABOLIC, FLUID AND ELECTROLYTES - ADULT  Goal: Electrolytes maintained within normal limits  Description: INTERVENTIONS:  - Monitor labs and assess patient for signs and symptoms of  electrolyte imbalances  - Administer electrolyte replacement as ordered  - Monitor response to electrolyte replacements, including repeat lab results as appropriate  - Instruct patient on fluid and nutrition as appropriate  Outcome: Progressing  Goal: Fluid balance maintained  Description: INTERVENTIONS:  - Monitor labs   - Monitor I/O and WT  - Instruct patient on fluid and nutrition as appropriate  - Assess for signs & symptoms of volume excess or deficit  Outcome: Progressing  Goal: Glucose maintained within target range  Description: INTERVENTIONS:  - Monitor Blood Glucose as ordered  - Assess for signs and symptoms of hyperglycemia and hypoglycemia  - Administer ordered medications to maintain glucose within target range  - Assess nutritional intake and initiate nutrition service referral as needed  Outcome: Progressing     Problem: HEMATOLOGIC - ADULT  Goal: Maintains hematologic stability  Description: INTERVENTIONS  - Assess for signs and symptoms of bleeding or hemorrhage  - Monitor labs  - Administer supportive blood products/factors as ordered and appropriate  Outcome: Progressing     Problem: MUSCULOSKELETAL - ADULT  Goal: Maintain or return mobility to safest level of function  Description: INTERVENTIONS:  - Assess patient's ability to carry out ADLs; assess patient's baseline for ADL function and identify physical deficits which impact ability to perform ADLs (bathing, care of mouth/teeth, toileting, grooming, dressing, etc.)  - Assess/evaluate cause of self-care deficits   - Assess range of motion  - Assess patient's mobility  - Assess patient's need for assistive devices and provide as appropriate  - Encourage maximum independence but intervene and supervise when necessary  - Involve family in performance of ADLs  - Assess for home care needs following discharge   - Consider OT consult to assist with ADL evaluation and planning for discharge  - Provide patient education as appropriate  Outcome:  Progressing  Goal: Maintain proper alignment of affected body part  Description: INTERVENTIONS:  - Support, maintain and protect limb and body alignment  - Provide patient/ family with appropriate education  Outcome: Progressing

## 2024-06-02 NOTE — ASSESSMENT & PLAN NOTE
Patient with a right-sided facial droop and arm and leg weakness.  NIH 1  CT head and CTA without abnormality  Loaded with aspirin and Plavix  Continue aspirin and statin daily   Consult neurology  Tele   Permissive HTN  MRI of the brain  Echocardiogram  Lipid panel with mildly low hdl  A1c pending  PT/OT/ST

## 2024-06-03 ENCOUNTER — APPOINTMENT (INPATIENT)
Dept: NON INVASIVE DIAGNOSTICS | Facility: HOSPITAL | Age: 49
DRG: 065 | End: 2024-06-03
Payer: COMMERCIAL

## 2024-06-03 ENCOUNTER — APPOINTMENT (INPATIENT)
Dept: MRI IMAGING | Facility: HOSPITAL | Age: 49
DRG: 065 | End: 2024-06-03
Payer: COMMERCIAL

## 2024-06-03 VITALS
DIASTOLIC BLOOD PRESSURE: 92 MMHG | WEIGHT: 261.6 LBS | OXYGEN SATURATION: 97 % | HEIGHT: 66 IN | HEART RATE: 82 BPM | BODY MASS INDEX: 42.04 KG/M2 | RESPIRATION RATE: 18 BRPM | TEMPERATURE: 97.9 F | SYSTOLIC BLOOD PRESSURE: 142 MMHG

## 2024-06-03 LAB
BSA FOR ECHO PROCEDURE: 2.24 M2
SL CV LV EF: 65

## 2024-06-03 PROCEDURE — 70551 MRI BRAIN STEM W/O DYE: CPT

## 2024-06-03 PROCEDURE — 93306 TTE W/DOPPLER COMPLETE: CPT | Performed by: INTERNAL MEDICINE

## 2024-06-03 PROCEDURE — 93306 TTE W/DOPPLER COMPLETE: CPT

## 2024-06-03 PROCEDURE — 99239 HOSP IP/OBS DSCHRG MGMT >30: CPT

## 2024-06-03 RX ORDER — ROSUVASTATIN CALCIUM 40 MG/1
40 TABLET, COATED ORAL DAILY
Qty: 30 TABLET | Refills: 0 | Status: SHIPPED | OUTPATIENT
Start: 2024-06-03

## 2024-06-03 RX ORDER — ATORVASTATIN CALCIUM 40 MG/1
40 TABLET, FILM COATED ORAL EVERY EVENING
Qty: 30 TABLET | Refills: 0 | Status: CANCELLED | OUTPATIENT
Start: 2024-06-03

## 2024-06-03 RX ORDER — CLOPIDOGREL BISULFATE 75 MG/1
75 TABLET ORAL DAILY
Status: DISCONTINUED | OUTPATIENT
Start: 2024-06-03 | End: 2024-06-03 | Stop reason: HOSPADM

## 2024-06-03 RX ORDER — NICOTINE 21 MG/24HR
1 PATCH, TRANSDERMAL 24 HOURS TRANSDERMAL DAILY
Qty: 28 PATCH | Refills: 0 | Status: SHIPPED | OUTPATIENT
Start: 2024-06-04

## 2024-06-03 RX ORDER — ASPIRIN 81 MG/1
81 TABLET, CHEWABLE ORAL DAILY
Qty: 30 TABLET | Refills: 0 | Status: SHIPPED | OUTPATIENT
Start: 2024-06-04

## 2024-06-03 RX ORDER — CLOPIDOGREL BISULFATE 75 MG/1
75 TABLET ORAL DAILY
Qty: 20 TABLET | Refills: 0 | Status: SHIPPED | OUTPATIENT
Start: 2024-06-04

## 2024-06-03 RX ADMIN — ASPIRIN 81 MG 81 MG: 81 TABLET ORAL at 08:35

## 2024-06-03 RX ADMIN — CLOPIDOGREL 75 MG: 75 TABLET ORAL at 08:35

## 2024-06-03 NOTE — PLAN OF CARE
Problem: PAIN - ADULT  Goal: Verbalizes/displays adequate comfort level or baseline comfort level  Description: Interventions:  - Encourage patient to monitor pain and request assistance  - Assess pain using appropriate pain scale  - Administer analgesics based on type and severity of pain and evaluate response  - Implement non-pharmacological measures as appropriate and evaluate response  - Consider cultural and social influences on pain and pain management  - Notify physician/advanced practitioner if interventions unsuccessful or patient reports new pain  Outcome: Progressing     Problem: INFECTION - ADULT  Goal: Absence or prevention of progression during hospitalization  Description: INTERVENTIONS:  - Assess and monitor for signs and symptoms of infection  - Monitor lab/diagnostic results  - Monitor all insertion sites, i.e. indwelling lines, tubes, and drains  - Monitor endotracheal if appropriate and nasal secretions for changes in amount and color  - [place] appropriate cooling/warming therapies per order  - Administer medications as ordered  - Instruct and encourage patient and family to use good hand hygiene technique  - Identify and instruct in appropriate isolation precautions for identified infection/condition  Outcome: Progressing  Goal: Absence of fever/infection during neutropenic period  Description: INTERVENTIONS:  - Monitor WBC    Outcome: Progressing     Problem: SAFETY ADULT  Goal: Patient will remain free of falls  Description: INTERVENTIONS:  - Educate patient/family on patient safety including physical limitations  - Instruct patient to call for assistance with activity   - Consult OT/PT to assist with strengthening/mobility   - Keep Call bell within reach  - Keep bed low and locked with side rails adjusted as appropriate  - Keep care items and personal belongings within reach  - Initiate and maintain comfort rounds  - Make Fall Risk Sign visible to staff  - Apply yellow socks and bracelet  for high fall risk patients  - Consider moving patient to room near nurses station  Outcome: Progressing  Goal: Maintain or return to baseline ADL function  Description: INTERVENTIONS:  -  Assess patient's ability to carry out ADLs; assess patient's baseline for ADL function and identify physical deficits which impact ability to perform ADLs (bathing, care of mouth/teeth, toileting, grooming, dressing, etc.)  - Assess/evaluate cause of self-care deficits   - Assess range of motion  - Assess patient's mobility; develop plan if impaired  - Assess patient's need for assistive devices and provide as appropriate  - Encourage maximum independence but intervene and supervise when necessary  - Involve family in performance of ADLs  - Assess for home care needs following discharge   - Consider OT consult to assist with ADL evaluation and planning for discharge  - Provide patient education as appropriate  Outcome: Progressing  Goal: Maintains/Returns to pre admission functional level  Description: INTERVENTIONS:  - Perform AM-PAC 6 Click Basic Mobility/ Daily Activity assessment daily.  - Set and communicate daily mobility goal to care team and patient/family/caregiver.   - Collaborate with rehabilitation services on mobility goals if consulted  - Perform Range of Motion 3 times a day.  - Reposition patient every 2 hours.  - Dangle patient 3 times a day  - Stand patient 3 times a day  - Ambulate patient 3 times a day  - Out of bed to chair 3 times a day   - Out of bed for meals 3 times a day  - Out of bed for toileting  - Record patient progress and toleration of activity level   Outcome: Progressing     Problem: DISCHARGE PLANNING  Goal: Discharge to home or other facility with appropriate resources  Description: INTERVENTIONS:  - Identify barriers to discharge w/patient and caregiver  - Arrange for needed discharge resources and transportation as appropriate  - Identify discharge learning needs (meds, wound care, etc.)  -  Arrange for interpretive services to assist at discharge as needed  - Refer to Case Management Department for coordinating discharge planning if the patient needs post-hospital services based on physician/advanced practitioner order or complex needs related to functional status, cognitive ability, or social support system  Outcome: Progressing     Problem: Knowledge Deficit  Goal: Patient/family/caregiver demonstrates understanding of disease process, treatment plan, medications, and discharge instructions  Description: Complete learning assessment and assess knowledge base.  Interventions:  - Provide teaching at level of understanding  - Provide teaching via preferred learning methods  Outcome: Progressing     Problem: Neurological Deficit  Goal: Neurological status is stable or improving  Description: Interventions:  - Monitor and assess patient's level of consciousness, motor function, sensory function, and level of assistance needed for ADLs.   - Monitor and report changes from baseline. Collaborate with interdisciplinary team to initiate plan and implement interventions as ordered.   - Provide and maintain a safe environment.  - Consider seizure precautions.  - Consider fall precautions.  - Consider aspiration precautions.  - Consider bleeding precautions.  Outcome: Progressing     Problem: Activity Intolerance/Impaired Mobility  Goal: Mobility/activity is maintained at optimum level for patient  Description: Interventions:  - Assess and monitor patient  barriers to mobility and need for assistive/adaptive devices.  - Assess patient's emotional response to limitations.  - Collaborate with interdisciplinary team and initiate plans and interventions as ordered.  - Encourage independent activity per ability.  - Maintain proper body alignment.  - Perform active/passive rom as tolerated/ordered.  - Plan activities to conserve energy.  - Turn patient as appropriate  Outcome: Progressing     Problem: Communication  Impairment  Goal: Ability to express needs and understand communication  Description: Assess patient's communication skills and ability to understand information.  Patient will demonstrate use of effective communication techniques, alternative methods of communication and understanding even if not able to speak.     - Encourage communication and provide alternate methods of communication as needed.  - Collaborate with case management/ for discharge needs.  - Include patient/family/caregiver in decisions related to communication.  Outcome: Progressing     Problem: Potential for Aspiration  Goal: Non-ventilated patient's risk of aspiration is minimized  Description: Assess and monitor vital signs, respiratory status, and labs (WBC).  Monitor for signs of aspiration (tachypnea, cough, rales, wheezing, cyanosis, fever).    - Assess and monitor patient's ability to swallow.  - Place patient up in chair to eat if possible.  - HOB up at 90 degrees to eat if unable to get patient up into chair.  - Supervise patient during oral intake.   - Instruct patient/ family to take small bites.  - Instruct patient/ family to take small single sips when taking liquids.  - Follow patient-specific strategies generated by speech pathologist.  Outcome: Progressing  Goal: Ventilated patient's risk of aspiration is minimized  Description: Assess and monitor vital signs, respiratory status, airway cuff pressure, and labs (WBC).  Monitor for signs of aspiration (tachypnea, cough, rales, wheezing, cyanosis, fever).    - Elevate head of bed 30 degrees if patient has tube feeding.  - Monitor tube feeding.  Outcome: Progressing     Problem: Nutrition  Goal: Nutrition/Hydration status is improving  Description: Monitor and assess patient's nutrition/hydration status for malnutrition (ex- brittle hair, bruises, dry skin, pale skin and conjunctiva, muscle wasting, smooth red tongue, and disorientation). Collaborate with  interdisciplinary team and initiate plan and interventions as ordered.  Monitor patient's weight and dietary intake as ordered or per policy. Utilize nutrition screening tool and intervene per policy. Determine patient's food preferences and provide high-protein, high-caloric foods as appropriate.     - Assist patient with eating.  - Allow adequate time for meals.  - Encourage patient to take dietary supplement as ordered.  - Collaborate with clinical nutritionist.  - Include patient/family/caregiver in decisions related to nutrition.  Outcome: Progressing     Problem: NEUROSENSORY - ADULT  Goal: Achieves stable or improved neurological status  Description: INTERVENTIONS  - Monitor and report changes in neurological status  - Monitor vital signs such as temperature, blood pressure, glucose, and any other labs ordered   - Initiate measures to prevent increased intracranial pressure  - Monitor for seizure activity and implement precautions if appropriate      Outcome: Progressing  Goal: Remains free of injury related to seizures activity  Description: INTERVENTIONS  - Maintain airway, patient safety  and administer oxygen as ordered  - Monitor patient for seizure activity, document and report duration and description of seizure to physician/advanced practitioner  - If seizure occurs,  ensure patient safety during seizure  - Reorient patient post seizure  - Seizure pads on all 4 side rails  - Instruct patient/family to notify RN of any seizure activity including if an aura is experienced  - Instruct patient/family to call for assistance with activity based on nursing assessment  - Administer anti-seizure medications if ordered    Outcome: Progressing  Goal: Achieves maximal functionality and self care  Description: INTERVENTIONS  - Monitor swallowing and airway patency with patient fatigue and changes in neurological status  - Encourage and assist patient to increase activity and self care.   - Encourage visually  impaired, hearing impaired and aphasic patients to use assistive/communication devices  Outcome: Progressing     Problem: CARDIOVASCULAR - ADULT  Goal: Maintains optimal cardiac output and hemodynamic stability  Description: INTERVENTIONS:  - Monitor I/O, vital signs and rhythm  - Monitor for S/S and trends of decreased cardiac output  - Administer and titrate ordered vasoactive medications to optimize hemodynamic stability  - Assess quality of pulses, skin color and temperature  - Assess for signs of decreased coronary artery perfusion  - Instruct patient to report change in severity of symptoms  Outcome: Progressing  Goal: Absence of cardiac dysrhythmias or at baseline rhythm  Description: INTERVENTIONS:  - Continuous cardiac monitoring, vital signs, obtain 12 lead EKG if ordered  - Administer antiarrhythmic and heart rate control medications as ordered  - Monitor electrolytes and administer replacement therapy as ordered  Outcome: Progressing     Problem: RESPIRATORY - ADULT  Goal: Achieves optimal ventilation and oxygenation  Description: INTERVENTIONS:  - Assess for changes in respiratory status  - Assess for changes in mentation and behavior  - Position to facilitate oxygenation and minimize respiratory effort  - Oxygen administered by appropriate delivery if ordered  - Initiate smoking cessation education as indicated  - Encourage broncho-pulmonary hygiene including cough, deep breathe, Incentive Spirometry  - Assess the need for suctioning and aspirate as needed  - Assess and instruct to report SOB or any respiratory difficulty  - Respiratory Therapy support as indicated  Outcome: Progressing     Problem: METABOLIC, FLUID AND ELECTROLYTES - ADULT  Goal: Electrolytes maintained within normal limits  Description: INTERVENTIONS:  - Monitor labs and assess patient for signs and symptoms of electrolyte imbalances  - Administer electrolyte replacement as ordered  - Monitor response to electrolyte replacements,  including repeat lab results as appropriate  - Instruct patient on fluid and nutrition as appropriate  Outcome: Progressing  Goal: Fluid balance maintained  Description: INTERVENTIONS:  - Monitor labs   - Monitor I/O and WT  - Instruct patient on fluid and nutrition as appropriate  - Assess for signs & symptoms of volume excess or deficit  Outcome: Progressing  Goal: Glucose maintained within target range  Description: INTERVENTIONS:  - Monitor Blood Glucose as ordered  - Assess for signs and symptoms of hyperglycemia and hypoglycemia  - Administer ordered medications to maintain glucose within target range  - Assess nutritional intake and initiate nutrition service referral as needed  Outcome: Progressing     Problem: HEMATOLOGIC - ADULT  Goal: Maintains hematologic stability  Description: INTERVENTIONS  - Assess for signs and symptoms of bleeding or hemorrhage  - Monitor labs  - Administer supportive blood products/factors as ordered and appropriate  Outcome: Progressing     Problem: MUSCULOSKELETAL - ADULT  Goal: Maintain or return mobility to safest level of function  Description: INTERVENTIONS:  - Assess patient's ability to carry out ADLs; assess patient's baseline for ADL function and identify physical deficits which impact ability to perform ADLs (bathing, care of mouth/teeth, toileting, grooming, dressing, etc.)  - Assess/evaluate cause of self-care deficits   - Assess range of motion  - Assess patient's mobility  - Assess patient's need for assistive devices and provide as appropriate  - Encourage maximum independence but intervene and supervise when necessary  - Involve family in performance of ADLs  - Assess for home care needs following discharge   - Consider OT consult to assist with ADL evaluation and planning for discharge  - Provide patient education as appropriate  Outcome: Progressing  Goal: Maintain proper alignment of affected body part  Description: INTERVENTIONS:  - Support, maintain and  protect limb and body alignment  - Provide patient/ family with appropriate education  Outcome: Progressing

## 2024-06-03 NOTE — SPEECH THERAPY NOTE
Speech Language/Pathology  Consult received.  Records reviewed.  Pt admitted c symptoms concerning for CVA/TIA.  Pt passed RN Dysphagia Assessment.  Communication deficits denied. Provided oral motor exercises due to slight right facial droop. Adequate strength and ROM, droop only noted at rest.  MRI results  pending.  No additional inpatient Speech Pathology evaluation appears indicated at this time.  Please re-consult if additional concerns arise. Thank you.    Brittney Ly, MS CCC-SLP  6/3/2024

## 2024-06-03 NOTE — DISCHARGE INSTR - AVS FIRST PAGE
Dear Gregor Marrero,     It was our pleasure to care for you here at [facility]. For follow up as well as any medication refills, we recommend that you follow up with your primary care physician. Here are the most important instructions/ recommendations at discharge:     Notable Medication Adjustments -   Start taking aspirin 81 mg and plavix 75 mg daily for 20 days (total of 21 days, received in the hospital, start taking on 6/4), then start taking aspirin 81 mg daily after this.   Start taking crestor 40 mg daily  Testing Required after Discharge -   Need to schedule a MARIPOSA with cardiology in 2 weeks outpatient due to having found a PFO. This increases your stroke risk in the future. Please call for appointment regarding this.   Need to have zio patch/loop recorder outpatient with cardiology  Further testing to be determined by neurology outpatient if needed  Important follow up information -   Follow up with PCP in 1 week  Follow up with neurology outpatient in 4-6 weeks  Follow up with PT/OT outpatient  Follow up with cardiology in 1-2 weeks for scheduling MARIPOSA and for loop recorder/zio patch  Other Instructions -   Please continue taking medications as prescribed. It is important that you continue to take these medications as instructed. If you have any new or worsening symptoms, please return to the ED for evaluation.   Hold off on driving for this week, please follow up with PT/OT regarding driving and if you are cleared to drive due to the weakness with dorsiflexion in the right foot.   Please review this entire after visit summary as additional general instructions including medication list, appointments, activity, diet, any pertinent wound care, and other additional recommendations from your care team that may be provided for you.      Sincerely,     Makayla Bermeo PA-C

## 2024-06-03 NOTE — OCCUPATIONAL THERAPY NOTE
Occupational Therapy Screen     Patient Name: Gregor Marrero  Today's Date: 6/3/2024  Problem List  Principal Problem:    Stroke-like symptoms  Active Problems:    Vaping nicotine dependence, non-tobacco product    Anxiety    Marijuana use    Obesity, Class III, BMI 40-49.9 (morbid obesity) (HCC)    Past Medical History  Past Medical History:   Diagnosis Date    No known problems     Substance abuse (HCC)      Past Surgical History  Past Surgical History:   Procedure Laterality Date    NO PAST SURGERIES      OTHER SURGICAL HISTORY      right armsurgery deeplaceration           06/03/24 0745   Note Type   Note type Screen       OT orders received. Chart review completed. Patient admitted to [facility] on 6/1 with Dx: Stroke-like symptoms. Spoke with pt's nursing staff who reported patient was I in room. Spoke with pt who reported no concerns regarding ADLs, IADLs or functional mobility upon return to home. Pt appears to be at prior level of functioning at this time and does not require acute OT services. D/C OT effective this date. If new concerns arise, please re-consult.    Roberto Kerns MS, OTR/L

## 2024-06-03 NOTE — CASE MANAGEMENT
Case Management Assessment & Discharge Planning Note    Patient name Gregor Marrero  Location /-01 MRN [id number]  : 1975 Date 6/3/2024       Current Admission Date: 2024  Current Admission Diagnosis:Stroke-like symptoms   Patient Active Problem List    Diagnosis Date Noted Date Diagnosed    Stroke-like symptoms 2024     Vaping nicotine dependence, non-tobacco product 2024     Anxiety 2024     Marijuana use 2024     Obesity, Class III, BMI 40-49.9 (morbid obesity) (HCC) 2024       LOS (days): 2  Geometric Mean LOS (GMLOS) (days):   Days to GMLOS:     OBJECTIVE:    Risk of Unplanned Readmission Score: 10.2         Current admission status: Inpatient  Referral Reason: Stroke    Preferred Pharmacy:   [id number] - [place], [place] - [address] [facility]  [address] [place] [address]  Phone: [phone] Fax: [phone]    [id number] [place], [place] - [address]  [address] [address]  Phone: [phone] Fax: [phone]    [facility] [address] [place] [address] - [address] [facility]  [address] [facility] [address]  Phone: [phone] Fax: [phone]    Primary Care Provider: Rehan Antonio    Primary Insurance: BLUE CROSS  Secondary Insurance:     ASSESSMENT:  Active Health Care Proxies    There are no active Health Care Proxies on file.       Advance Directives  Does patient have a Health Care POA?: No  Was patient offered paperwork?: Yes (declined)  Does patient currently have a Health Care decision maker?: Yes, please see Health Care Proxy section  Does patient have Advance Directives?: No  Was patient offered paperwork?: Yes (declined)  Primary Contact: [facility] CHANCE         Readmission [place]  30 Day Readmission: No    Patient Information  Admitted from:: Home  Mental Status: Alert  During Assessment patient was accompanied by: Spouse  Assessment information provided  by:: Patient  Primary Caregiver: Self  Support Systems: Spouse/significant other  County of Residence: [place]  What city do you live in?: [place]  Home entry access options. Select all that apply.: Stairs  Number of steps to enter home.: 2  Do the steps have railings?: Yes  Type of Current Residence: [address] [place] home  Upon entering residence, is there a bedroom on the main floor (no further steps)?: Yes  Upon entering residence, is there a bathroom on the main floor (no further steps)?: Yes  Living Arrangements: Lives w/ Spouse/significant other  Is patient a ?: No    Activities of Daily Living Prior to Admission  Functional Status: Independent  Completes ADLs independently?: Yes  Ambulates independently?: Yes  Does patient use assisted devices?: No  Does patient currently own DME?: No  Does patient have a history of Outpatient Therapy (PT/OT)?: No  Does the patient have a history of Short-Term Rehab?: No  Does patient have a history of HHC?: No  Does patient currently have HHC?: No         Patient Information Continued  Income Source: Employed ([employer])  Does patient have prescription coverage?: Yes  Does patient receive dialysis treatments?: No  Does patient have a history of substance abuse?: Yes, Currently using  Current substance use preference: Marijuana (medical marijuana for anxiety)  Historical substance use preference: Methamphetamines, Heroin  Is patient currently in treatment for substance abuse?: N/A - sober (5 years)  Does patient have a history of Mental Health Diagnosis?: Yes (anxiety)  Is patient receiving treatment for mental health?: Yes (medical marijuana)  Has patient received inpatient treatment related to mental health in the last 2 years?: No         Means of Transportation  Means of Transport to Appts:: Drives Self      Social Determinants of Health (SDOH)      Flowsheet Row Most Recent Value   Housing Stability    In the last 12 months, was there a time when you were not able to pay the  mortgage or rent on time? N   In the past 12 months, how many times have you moved where you were living? 1   At any time in the past 12 months, were you homeless or living in a shelter (including now)? N   Transportation Needs    In the past 12 months, has lack of transportation kept you from medical appointments or from getting medications? no   In the past 12 months, has lack of transportation kept you from meetings, work, or from getting things needed for daily living? No   Food Insecurity    Within the past 12 months, you worried that your food would run out before you got the money to buy more. Never true   Within the past 12 months, the food you bought just didn't last and you didn't have money to get more. Never true   Utilities    In the past 12 months has the electric, gas, oil, or water company threatened to shut off services in your home? No            DISCHARGE DETAILS:    Discharge planning discussed with:: Patient, Spouse  Freedom of Choice: Yes  Comments - Freedom of Choice: No needs anticipated at discharge  CM contacted family/caregiver?: Yes  Were Treatment Team discharge recommendations reviewed with patient/caregiver?: Yes  Did patient/caregiver verbalize understanding of patient care needs?: Yes  Were patient/caregiver advised of the risks associated with not following Treatment Team discharge recommendations?: Yes    Contacts  Patient Contacts: [facility]  Relationship to Patient:: Family  Contact Method: In Person  Reason/Outcome: Continuity of Care, Discharge Planning    Requested Home Health Care         Is the patient interested in HHC at discharge?: No    DME Referral Provided  Referral made for DME?: No    Other Referral/Resources/Interventions Provided:  Interventions: None Indicated    Would you like to participate in our [facility] service program?  : No - Declined    Treatment Team Recommendation: Home  Discharge Destination Plan:: Home  Transport at Discharge : Family           CM met with patient and significant other at the bedside,baseline information  was obtained. CM discussed the role of CM in helping the patient develop a discharge plan and assist the patient in carry out their plan.  CM discussed discharge panning, patient does not anticipate any needs at discharge.     CM to follow patient's care and discharge needs.

## 2024-06-03 NOTE — ASSESSMENT & PLAN NOTE
Patient with a right-sided facial droop and arm and leg weakness.  NIH 1  CT head and CTA without abnormality  Loaded with aspirin and Plavix tentative plan to continue DAPT x21 days then aspirin monotherapy, pending MRI brain and further workup  Continue atorvastatin  Consult neurology  Tele   Permissive HTN  MRI brain: Acute lacunar infarct in left lower paramedian talha. This region was obscured by beam-hardening streak artifact on CT stroke alert brain 6/1/2024.   Echocardiogram: Left ventricular cavity size is normal. Wall thickness is normal. The left ventricular ejection fraction is 65%. Systolic function is normal. Wall motion is normal. Diastolic function is normal. Interatrial septum is aneurysmal with small right to left shunt noted on agitated saline contrast study under provocative measures. There was no shunting under resting conditions.   Discussed with cardiology, will need MARIPOSA done, can be done outpatient in 2 weeks, referral given.   Lipid panel with mildly low hdl  A1c 5.3  PT/OT/ST  Follow up with neurology in 4-6 weeks. Continue with DAPT x 21 days, then aspirin monotherapy after that, lipitor 40 mg qd. Follow up with PCP in 1 week.   Patient having myalgias with lipitor, discussed with neuro, can switch to crestor 40 mg qd but not best option, explained that to patient but because of myalgias, he said he would prefer to switch or he will not take the medication. Needs to follow up with cardiology as well for zio patch/loop recorder. Stable for discharge from neuro stance.    Patient requesting testing for COVID-19. On exam, Patient appears well and in no apparent distress. Nasopharyngeal PCR has been obtained, and Patient has been guided on how to obtain their results.  General COVID-19 discharge instructions have been given to the Patient. Patient agrees to receiving results electronically.

## 2024-06-03 NOTE — DISCHARGE SUMMARY
[facility]  Discharge- Gregor Marrero 1975, 48 y.o. male MRN: [id number]  Unit/Bed#: -[facility] Encounter: [id number]  Primary Care Provider: Rehan Antonio   Date and time admitted to hospital: 6/1/2024  7:12 PM    * Stroke-like symptoms  Assessment & Plan  Patient with a right-sided facial droop and arm and leg weakness.  NIH 1  CT head and CTA without abnormality  Loaded with aspirin and Plavix tentative plan to continue DAPT x21 days then aspirin monotherapy, pending MRI brain and further workup  Continue atorvastatin  Consult neurology  Tele   Permissive HTN  MRI brain: Acute lacunar infarct in left lower paramedian talha. This region was obscured by beam-hardening streak artifact on CT stroke alert brain 6/1/2024.   Echocardiogram: Left ventricular cavity size is normal. Wall thickness is normal. The left ventricular ejection fraction is 65%. Systolic function is normal. Wall motion is normal. Diastolic function is normal. Interatrial septum is aneurysmal with small right to left shunt noted on agitated saline contrast study under provocative measures. There was no shunting under resting conditions.   Discussed with cardiology, will need MARIPOSA done, can be done outpatient in 2 weeks, referral given.   Lipid panel with mildly low hdl  A1c 5.3  PT/OT/ST  Follow up with neurology in 4-6 weeks. Continue with DAPT x 21 days, then aspirin monotherapy after that, lipitor 40 mg qd. Follow up with PCP in 1 week.   Patient having myalgias with lipitor, discussed with neuro, can switch to crestor 40 mg qd but not best option, explained that to patient but because of myalgias, he said he would prefer to switch or he will not take the medication. Needs to follow up with cardiology as well for zio patch/loop recorder. Stable for discharge from neuro stance.     Obesity, Class III, BMI 40-49.9 (morbid obesity) (HCC)  Assessment & Plan  Continue to educate on lifestyle changes     Marijuana  use  Assessment & Plan  For anxiety  Encouraged cessation    Anxiety  Assessment & Plan  Does not take medications, uses marijuana to cope with anxiety    Vaping nicotine dependence, non-tobacco product  Assessment & Plan  Will place the patient on a nicotine patch.      Medical Problems       Resolved Problems  Date Reviewed: 6/3/2024   None       Discharging Physician / Practitioner: Makalya Bermeo PA-C  PCP: Rehan Antonio  Admission Date:   Admission Orders (From admission, onward)       Ordered        06/01/24 2013  INPATIENT ADMISSION  Once                          Discharge Date: 06/03/24    Consultations During Hospital Stay:  Neurology, pt/ot/slp    Procedures Performed:   none    Significant Findings / Test Results:   MRI brain wo contrast   Final Result by Keagan Feliciano MD (06/03 1155)      Acute lacunar infarct in left lower paramedian talha. This region was obscured by beam-hardening streak artifact on CT stroke alert brain 6/1/2024.      Minimal chronic microangiopathy.         I personally discussed this study with Makayla Bermeo on 6/3/2024 11:53 AM.            Workstation performed: [id number]         CTA stroke alert (head/neck)   Final Result by Bull Celeste MD (06/01 1941)      No evidence for high-grade stenosis or major branch vessel occlusion of the cervical or intracranial vasculature.            Findings were directly discussed with Nicolas Meza at 7:40 p.m.                           Workstation performed: [id number]         CT stroke alert brain   Final Result by Bull Celeste MD (06/01 1942)      No CT evidence for large acute vascular distribution infarct or acute intracranial hemorrhage.      Findings were directly discussed with Nicolas Meza at approximately 7:40 p.m.      Workstation performed: [id number]           Echo: Left Ventricle: Left ventricular cavity size is normal. Wall thickness is normal. The left ventricular ejection fraction is 65%. Systolic  function is normal. Wall motion is normal. Diastolic function is normal. No valvular pathology noted. Interatrial septum is aneurysmal with small right to left shunt noted on agitated saline contrast study under provocative measures.  There was no shunting under resting conditions. No comparison study available.  TSH: 1.424  A1c: 5.3  Lipid panel: Cholesterol: 161, triglycerides: 136, HDL: 39, LDL: 95    Incidental Findings:   none     Test Results Pending at Discharge (will require follow up):   none     Outpatient Tests Requested:  Follow up with PCP in 1 week  Follow up with neurology in 6 weeks    Complications:  none    Reason for Admission: stroke like symptoms    Hospital Course:   Gregor Marrero is a 48 y.o. male patient who originally presented to the hospital on 6/1/2024 due to right sided facial droop, arm and leg weakness that started in the morning, resolved, and the returned in the afternoon. Presented to the ED and was a stroke alert. CT brain and CTA head/neck with results above. Neurology consulted and recommended admission for stroke workup along with loading with aspirin 325 mg and plavix 300 mg, then continuing with aspirin 81 mg and plavix 75 mg qd. Started on lipitor 40 mg qd. Lipid panel and A1c with results above. Recommended permissive HTN for 24 hours with goal of normotension after that. Of note, patient was refusing telemetry, so unable to see if any atrial fibrillation noted during hospitalization. MRI brain and echo ordered. MRI brain showed that patient did have a stroke. Discussed with neuro and still recommended continuing with dual antiplatelet therapy for 21 days, then aspirin 81 mg daily after this. Should continue with lipitor 40 mg daily. Did have some myalgias with starting lipitor, discussed with neurology regarding switching to crestor, can switch to crestor 40 mg qd. PT/OT/SLP saw patient, no recs from SLP end, no need from PT/OT end, but patient was concerned regarding  "right face weakness, right upper and lower extremity weakness, given outpatient PT information to assist with strength training outpatient. On discharge, patient should follow up with PCP in 1 week. Should follow up with neurology in 6 weeks. Follow up with cardiology regarding zio patch/loop recorder. Due to having shunting and PFO noted on echo, Needs to schedule with cardiology in 2 weeks to have MARIPOSA done outpatient. Discussed with patient and he is agreeable to this. Ok for discharge from neuro stance as well. Discussed continuing taking medications as prescribed and strict smoking cessation. Should continue with diet modifications and weight loss to help decrease stroke risk in the future. Should return to the ED if he has any new or worsening symptoms. Verbalized understanding and agreement to the above plans.     Please see above list of diagnoses and related plan for additional information.     Condition at Discharge: fair    Discharge Day Visit / Exam:   Subjective:  Patient seen and examined. No overnight events. Patient complains of right knee and elbow tenderness. Patient also reports slight weakness with dorsiflexion of the right foot. Patient denies HA, vision changes, difficulty swallowing, difficulty breathing, chest pain or tightness, abdominal pain, N/V/D, and numbness/tingling. Still feels that he has a slight right sided facial droop, but this is improved from when he came into the hospital.   Vitals: Blood Pressure: 142/92 (06/03/24 1530)  Pulse: 82 (06/03/24 1130)  Temperature: 97.9 °F (36.6 °C) (06/03/24 1530)  Temp Source: Temporal (06/03/24 1530)  Respirations: 18 (06/03/24 1530)  Height: 5' 6\" (167.6 cm) (06/01/24 2030)  Weight - Scale: 119 kg (261 lb 9.6 oz) (06/01/24 2030)  SpO2: 97 % (06/03/24 1130)  Exam:   Physical Exam  Vitals reviewed.   Constitutional:       Appearance: Normal appearance. He is obese. He is not ill-appearing.   HENT:      Head: Normocephalic and atraumatic.      " Comments: Right-sided facial drooping - mild     Mouth/Throat:      Mouth: Mucous membranes are moist.      Pharynx: Oropharynx is clear.   Eyes:      General:         Right eye: No discharge.         Left eye: No discharge.      Conjunctiva/sclera: Conjunctivae normal.      Pupils: Pupils are equal, round, and reactive to light.   Cardiovascular:      Rate and Rhythm: Normal rate and regular rhythm.      Heart sounds: Normal heart sounds.   Pulmonary:      Effort: Pulmonary effort is normal. No respiratory distress.      Breath sounds: No stridor. Wheezing present. No rhonchi or rales.      Comments: Saturating well on room air  Abdominal:      General: Bowel sounds are normal.      Palpations: Abdomen is soft.   Musculoskeletal:         General: Normal range of motion.      Cervical back: Normal range of motion and neck supple.   Skin:     General: Skin is warm and dry.      Capillary Refill: Capillary refill takes less than 2 seconds.   Neurological:      General: No focal deficit present.      Mental Status: He is alert and oriented to person, place, and time. Mental status is at baseline.      Motor: Weakness present.      Comments: Slight weakness with right-sided dorsiflexion.  Sensation equal and intact bilaterally   Tongue protrudes midline, sensation equal bilateral sides of face  No slurred speech   Psychiatric:         Mood and Affect: Mood normal.         Behavior: Behavior normal.         Thought Content: Thought content normal.         Judgment: Judgment normal.          Discussion with Family: Patient declined call to .     Discharge instructions/Information to patient and family:   See after visit summary for information provided to patient and family.      Provisions for Follow-Up Care:  See after visit summary for information related to follow-up care and any pertinent home health orders.      Mobility at time of Discharge:   Basic Mobility Inpatient Raw Score: 24  -[facility] Goal: 8:  Walk 250 feet or more  JH-HLM Achieved: 8: Walk 250 feet ot more  HLM Goal achieved. Continue to encourage appropriate mobility.     Disposition:   Home    Planned Readmission: no     Discharge Statement:  I spent 46 minutes discharging the patient. This time was spent on the day of discharge. I had direct contact with the patient on the day of discharge. Greater than 50% of the total time was spent examining patient, answering all patient questions, arranging and discussing plan of care with patient as well as directly providing post-discharge instructions.  Additional time then spent on discharge activities.    Discharge Medications:  See after visit summary for reconciled discharge medications provided to patient and/or family.      **Please Note: This note may have been constructed using a voice recognition system**

## 2024-06-04 NOTE — UTILIZATION REVIEW
NOTIFICATION OF INPATIENT ADMISSION   AUTHORIZATION REQUEST   SERVICING FACILITY:   [place] [address]  Tax ID: [id number]  NPI: [id number] ATTENDING PROVIDER:  Attending Name and NPI#: Carol Bull Md [[id number]]  Address: [address] [place] [address]  Phone: [phone]   ADMISSION INFORMATION:  Place of Service: Inpatient Acute [facility]  Place of Service Code: 21  Inpatient Admission Date/Time: 6/1/24  8:13 PM  Discharge Date/Time: 6/3/2024  5:53 PM  Admitting Diagnosis Code/Description:  Facial droop [R29.810]  CVA (cerebral vascular accident) (HCC) [I63.9]  Right sided weakness [R53.1]  National Institutes of Health (NIH) Stroke Scale level of consciousness score 0, alert; keenly responsive [Z78.9]     UTILIZATION REVIEW CONTACT:  Alla Cochran, Utilization   Network Utilization Review Department  Phone: [phone]  Fax [phone]  Email: [email or web address]  Contact for approvals/pending authorizations, clinical reviews, and discharge.     PHYSICIAN ADVISORY SERVICES:  Medical Necessity Denial & Tdan-bo-Dojl Review  Phone: [phone]  Fax: [phone]  Email: [email or web address]     DISCHARGE SUPPORT TEAM:  For Patients Discharge Needs & Updates  Phone: [phone] opt. 2 Fax: [phone]  Email: [email or web address]

## 2024-06-25 ENCOUNTER — OFFICE VISIT (OUTPATIENT)
Dept: CARDIOLOGY CLINIC | Facility: CLINIC | Age: 49
End: 2024-06-25
Payer: COMMERCIAL

## 2024-06-25 VITALS
HEART RATE: 81 BPM | BODY MASS INDEX: 43.39 KG/M2 | HEIGHT: 66 IN | SYSTOLIC BLOOD PRESSURE: 122 MMHG | WEIGHT: 270 LBS | OXYGEN SATURATION: 96 % | DIASTOLIC BLOOD PRESSURE: 88 MMHG

## 2024-06-25 DIAGNOSIS — I65.23 CAROTID ATHEROSCLEROSIS, BILATERAL: ICD-10-CM

## 2024-06-25 DIAGNOSIS — I63.9 CVA (CEREBRAL VASCULAR ACCIDENT) (HCC): ICD-10-CM

## 2024-06-25 DIAGNOSIS — E66.01 OBESITY, CLASS III, BMI 40-49.9 (MORBID OBESITY) (HCC): Primary | ICD-10-CM

## 2024-06-25 PROCEDURE — 99204 OFFICE O/P NEW MOD 45 MIN: CPT | Performed by: INTERNAL MEDICINE

## 2024-06-25 NOTE — ASSESSMENT & PLAN NOTE
His recent CTA showed mild bilateral carotid atherosclerosis as well as atherosclerosis of the vertebral artery.  He is on full dose statins.  I have advised him that he needs to start seeing a primary care physician for ongoing medical care.

## 2024-06-25 NOTE — PROGRESS NOTES
[facility] [place]  [address]  [address]  [facility] [address] [phone]      Patient name: Gregor Marrero   YOB: 1975   MR no: [id number]      Diagnosis ICD-10-CM Associated Orders   1. Obesity, Class III, BMI 40-49.9 (morbid obesity) ([facility])  E66.01       2. CVA (cerebral vascular accident) ([facility])  I63.9 Ambulatory referral to Cardiology     MARIPOSA     AMB extended holter monitor      3. Carotid atherosclerosis, bilateral  I65.23         ASSESSMENT AND RECOMMENDATIONS:  1. Obesity, Class III, BMI 40-49.9 (morbid obesity) ([facility])  Assessment & Plan:  Needs aggressive lifestyle modification for weight loss.  2. CVA (cerebral vascular accident) ([facility])  Assessment & Plan:  Patient suffered cryptogenic stroke and his RoPE score is 7 implying a 72% chance that the stroke is due to the PFO.  We will proceed with a MARIPOSA for further evaluation and then decide about percutaneous closure.  In the meanwhile he will continue the aspirin.  We will also proceed with a 14-day ZIO monitor to rule out any atrial fibrillation.  He has completely recovered from his recent stroke.  From a cardiac standpoint, there is no restriction for him to return back to work.  He does not have a primary care physician and has no neurological appointment in the near future.  We will try to fill his return to work forms.  Orders:  -     Ambulatory referral to Cardiology  -     MARIPOSA; Future; Expected date: 06/25/2024  -     AMB extended holter monitor; Future; Expected date: 06/25/2024  3. Carotid atherosclerosis, bilateral  Assessment & Plan:  His recent CTA showed mild bilateral carotid atherosclerosis as well as atherosclerosis of the vertebral artery.  He is on full dose statins.  I have advised him that he needs to start seeing a primary care physician for ongoing medical care.       CHIEF COMPLAINT:  Recent stroke    HPI:  48-year-old male with no significant past medical  history was recently hospitalized in June 2024 with a right arm and right leg weakness.  He was found to have left-sided lacunar stroke in the left lower paramedian talha.  He was started on dual antiplatelet therapy by neurology.  He showed complete recovery of his neurological deficit while he was in the hospital.  An echocardiogram was done which showed normal left ventricular systolic function with aneurysmal interatrial septum with right-to-left shunting and he was referred to cardiology for outpatient evaluation as well as rule out any atrial fibrillation by prolonged monitoring.  Since his discharge he is back to his normal health.  He remains quite active and denies any chest pain, dyspnea on exertion, palpitations or syncope.    Past Medical History:   Diagnosis Date    No known problems     Substance abuse (HCC)         Past Surgical History:   Procedure Laterality Date    NO PAST SURGERIES      OTHER SURGICAL HISTORY      right armsurgery deeplaceration        Social History     Tobacco Use    Smoking status: Former     Current packs/day: 0.50     Types: Cigarettes    Smokeless tobacco: Never   Vaping Use    Vaping status: Every Day    Substances: Nicotine, Flavoring   Substance Use Topics    Alcohol use: Yes     Alcohol/week: 2.0 standard drinks of alcohol     Types: 2 Cans of beer per week    Drug use: Yes     Types: Marijuana     Comment: medical marijuana   Works in a [employer].    Family History   Problem Relation Age of Onset    Diabetes Mother     Heart attack Father         No Known Allergies      Current Outpatient Medications:     aspirin 81 mg chewable tablet, Chew 1 tablet (81 mg total) daily, Disp: 30 tablet, Rfl: 0    nicotine (NICODERM CQ) 14 mg/24hr TD 24 hr patch, Place 1 patch on the skin over 24 hours daily, Disp: 28 patch, Rfl: 0    rosuvastatin (CRESTOR) 40 MG tablet, Take 1 tablet (40 mg total) by mouth daily, Disp: 30 tablet, Rfl: 0    clopidogrel (PLAVIX) 75 mg tablet, Take 1 tablet  "(75 mg total) by mouth daily (Patient not taking: Reported on 6/25/2024), Disp: 20 tablet, Rfl: 0    methadone (DOLOPHINE) 10 mg tablet, Take by mouth every 6 (six) hours as needed for moderate pain ,  (Patient not taking: Reported on 5/16/2022,), Disp: , Rfl:      Lab Results   Component Value Date    CREATININE 0.87 06/02/2024    CREATININE 0.99 06/01/2024    K 4.0 06/02/2024    K 3.9 06/01/2024    SODIUM 137 06/02/2024    SODIUM 137 06/01/2024    LDLCALC 95 06/02/2024    TRIG 136 06/02/2024    HDL 39 (L) 06/02/2024    CHOLESTEROL 161 06/02/2024       I have personally reviewed the EKG from June 1, 2024 which showed sinus tachycardia with occasional PVCs and incomplete right bundle branch block.    REVIEW OF SYSTEMS   Positive for: As per HPI  Negative for: All remaining as reviewed below and in HPI.    SYSTEM SYMPTOMS REVIEWED:  General--weight change, fever, night sweats  Respiratory--cough, wheezing, shortness of breath, sputum production  Cardiovascular--chest pain, syncope, dyspnea on exertion, edema, decline in exercise tolerance, claudication   Gastrointestinal--persistent vomiting, diarrhea, abdominal distention, blood in stool   Muscular or skeletal--joint pain or swelling   Neurologic--headaches, syncope, abnormal movement  Hematologic--history of easy bruising and bleeding   Endocrine--thyroid enlargement, heat or cold intolerance, polyuria   Psychiatric--anxiety, depression     Vitals:    06/25/24 1343   BP: 122/88   Pulse: 81   SpO2: 96%   Weight: 122 kg (270 lb)   Height: 5' 6\" (1.676 m)       Wt Readings from Last 3 Encounters:   06/25/24 122 kg (270 lb)   06/01/24 119 kg (261 lb 9.6 oz)   05/16/22 127 kg (280 lb)        Body mass index is 43.58 kg/m².     General physical examination:    General appearance: Alert, no acute distress, appears stated age, severe obesity.  HEENT: Mucous membranes are moist.  No obvious abnormality noted.  Neck: Supple with no lymphadenopathy.  No JVD.  Carotid pulses " "are intact.  No carotid bruit.  Cardiovascular system: Regular rhythm.  Normal S1 and S2.  No murmurs.  No rubs or gallops. Extremities: No edema. No cyanosis.  Pulmonary: Respirations unlabored.  Good air entry bilaterally.  Clear to auscultation bilaterally.  Gastrointestinal: Abdomen is soft and nontender.  Bowel sounds are positive.  Musculoskeletal: Upper Extremities: Normal upper motor strength. Lower Extremity: Normal motor strength. Gait: Normal.   Skin: Skin is warm. No rashes or lesions.  Neurological: Patient is alert and oriented with no gross motor deficits.  Psychiatric: Mood is anxious.  Behavior is normal.        Thank you for allowing me to be a part of this patient's care. If you have further questions, please feel free to contact me.    Davis Alas MD, FACC, VIKTOR    Portions of the record  have been created with voice recognition software.  Occasional grammatical mistakes or wrong word or \"sound alike\" substitutions may have occurred due to the inherent limitations of voice recognition software. Please reach out to me directly for any clarifications.     "

## 2024-06-25 NOTE — ASSESSMENT & PLAN NOTE
Patient suffered cryptogenic stroke and his RoPE score is 7 implying a 72% chance that the stroke is due to the PFO.  We will proceed with a MARIPOSA for further evaluation and then decide about percutaneous closure.  In the meanwhile he will continue the aspirin.  We will also proceed with a 14-day ZIO monitor to rule out any atrial fibrillation.  He has completely recovered from his recent stroke.  From a cardiac standpoint, there is no restriction for him to return back to work.  He does not have a primary care physician and has no neurological appointment in the near future.  We will try to fill his return to work forms.

## 2024-06-28 ENCOUNTER — TELEPHONE (OUTPATIENT)
Age: 49
End: 2024-06-28

## 2024-06-28 NOTE — TELEPHONE ENCOUNTER
P/C about his disability paperwork, he had dropped off the other day, and needs it before Monday to be able to return to work.     C/b [phone]

## 2024-07-01 ENCOUNTER — TELEPHONE (OUTPATIENT)
Dept: NON INVASIVE DIAGNOSTICS | Facility: HOSPITAL | Age: 49
End: 2024-07-01

## 2024-07-01 NOTE — TELEPHONE ENCOUNTER
Spoke with patient. Reviewed the following instructions with him:  1). NPO after midnight tues 7/2 into wed July 3. You can take morning meds with a sip of water before coming to the hospital  2). Arrival time is 7am and you will need to have a  with you because you may not drive home after receiving anesthesia.  Patient stated verbal understanding of these instructions

## 2024-07-02 ENCOUNTER — ANESTHESIA EVENT (OUTPATIENT)
Dept: NON INVASIVE DIAGNOSTICS | Facility: HOSPITAL | Age: 49
End: 2024-07-02

## 2024-07-02 NOTE — ANESTHESIA PREPROCEDURE EVALUATION
Procedure:  MARIPOSA    Relevant Problems   NEURO/PSYCH   (+) Anxiety   (+) CVA (cerebral vascular accident) (HCC)        Physical Exam    Airway    Mallampati score: II  TM Distance: >3 FB  Neck ROM: full     Dental   No notable dental hx     Cardiovascular  Cardiovascular exam normal    Pulmonary  Pulmonary exam normal     Other Findings      Left Ventricle: Left ventricular cavity size is normal. Wall thickness is normal. The left ventricular ejection fraction is 65%. Systolic function is normal. Wall motion is normal. Diastolic function is normal.    No valvular pathology noted.    Interatrial septum is aneurysmal with small right to left shunt noted on agitated saline contrast study under provocative measures.  There was no shunting under resting conditions.    No comparison study available.    Anesthesia Plan  ASA Score- 3     Anesthesia Type- IV sedation with anesthesia with ASA Monitors.         Additional Monitors:     Airway Plan:            Plan Factors-Exercise tolerance (METS): >4 METS.    Chart reviewed. EKG reviewed. Imaging results reviewed. Existing labs reviewed. Patient summary reviewed.    Patient is not a current smoker.      Obstructive sleep apnea risk education given perioperatively.        Induction- intravenous.    Postoperative Plan-     Perioperative Resuscitation Plan - Level 1 - Full Code.       Informed Consent- Anesthetic plan and risks discussed with patient.  I personally reviewed this patient with the CRNA. Discussed and agreed on the Anesthesia Plan with the CRNA..      Sinus tachycardia with occasional Premature ventricular complexes  Incomplete right bundle branch block  Borderline ECG  No previous ECGs available

## 2024-07-03 ENCOUNTER — HOSPITAL ENCOUNTER (OUTPATIENT)
Dept: NON INVASIVE DIAGNOSTICS | Facility: HOSPITAL | Age: 49
Discharge: HOME | End: 2024-07-03
Attending: INTERNAL MEDICINE
Payer: COMMERCIAL

## 2024-07-03 ENCOUNTER — ANESTHESIA (OUTPATIENT)
Dept: NON INVASIVE DIAGNOSTICS | Facility: HOSPITAL | Age: 49
End: 2024-07-03

## 2024-07-03 VITALS
HEIGHT: 66 IN | RESPIRATION RATE: 20 BRPM | HEART RATE: 65 BPM | TEMPERATURE: 97.8 F | BODY MASS INDEX: 40.18 KG/M2 | SYSTOLIC BLOOD PRESSURE: 148 MMHG | OXYGEN SATURATION: 97 % | DIASTOLIC BLOOD PRESSURE: 94 MMHG | WEIGHT: 250 LBS

## 2024-07-03 DIAGNOSIS — I63.9 CVA (CEREBRAL VASCULAR ACCIDENT) (HCC): ICD-10-CM

## 2024-07-03 LAB
BSA FOR ECHO PROCEDURE: 2.24 M2
SL CV LV EF: 65

## 2024-07-03 PROCEDURE — 93312 ECHO TRANSESOPHAGEAL: CPT | Performed by: INTERNAL MEDICINE

## 2024-07-03 PROCEDURE — 93312 ECHO TRANSESOPHAGEAL: CPT

## 2024-07-03 RX ORDER — PROPOFOL 10 MG/ML
INJECTION, EMULSION INTRAVENOUS AS NEEDED
Status: DISCONTINUED | OUTPATIENT
Start: 2024-07-03 | End: 2024-07-03

## 2024-07-03 RX ORDER — LIDOCAINE HYDROCHLORIDE 20 MG/ML
INJECTION, SOLUTION EPIDURAL; INFILTRATION; INTRACAUDAL; PERINEURAL AS NEEDED
Status: DISCONTINUED | OUTPATIENT
Start: 2024-07-03 | End: 2024-07-03

## 2024-07-03 RX ORDER — SODIUM CHLORIDE, SODIUM LACTATE, POTASSIUM CHLORIDE, CALCIUM CHLORIDE 600; 310; 30; 20 MG/100ML; MG/100ML; MG/100ML; MG/100ML
INJECTION, SOLUTION INTRAVENOUS CONTINUOUS PRN
Status: DISCONTINUED | OUTPATIENT
Start: 2024-07-03 | End: 2024-07-03

## 2024-07-03 RX ADMIN — PROPOFOL 50 MG: 10 INJECTION, EMULSION INTRAVENOUS at 08:26

## 2024-07-03 RX ADMIN — PROPOFOL 20 MG: 10 INJECTION, EMULSION INTRAVENOUS at 08:20

## 2024-07-03 RX ADMIN — PROPOFOL 20 MG: 10 INJECTION, EMULSION INTRAVENOUS at 08:28

## 2024-07-03 RX ADMIN — PROPOFOL 160 MG: 10 INJECTION, EMULSION INTRAVENOUS at 08:19

## 2024-07-03 RX ADMIN — PROPOFOL 50 MG: 10 INJECTION, EMULSION INTRAVENOUS at 08:24

## 2024-07-03 RX ADMIN — LIDOCAINE HYDROCHLORIDE 100 MG: 20 INJECTION, SOLUTION EPIDURAL; INFILTRATION; INTRACAUDAL; PERINEURAL at 08:19

## 2024-07-03 RX ADMIN — SODIUM CHLORIDE, SODIUM LACTATE, POTASSIUM CHLORIDE, AND CALCIUM CHLORIDE: .6; .31; .03; .02 INJECTION, SOLUTION INTRAVENOUS at 08:16

## 2024-07-03 RX ADMIN — PROPOFOL 20 MG: 10 INJECTION, EMULSION INTRAVENOUS at 08:27

## 2024-07-03 RX ADMIN — PROPOFOL 50 MG: 10 INJECTION, EMULSION INTRAVENOUS at 08:22

## 2024-07-03 RX ADMIN — PROPOFOL 30 MG: 10 INJECTION, EMULSION INTRAVENOUS at 08:21

## 2024-07-03 NOTE — INTERVAL H&P NOTE
Update: (This section must be completed if the H&P was completed greater than 24 hrs to procedure or admission)    H&P reviewed. After examining the patient, I find no changed to the H&P since it had been written.    Patient re-evaluated. Accept as history and physical.    Davis Alas MD/July 3, 2024/8:09 AM

## 2024-07-03 NOTE — DISCHARGE INSTR - AVS FIRST PAGE
You can start eating and drinking after about 1 hour.  You might feel some soreness in your throat on swallowing for today.  If you have any significant pain or bleeding, please report back to the emergency room immediately.

## 2024-07-03 NOTE — ANESTHESIA POSTPROCEDURE EVALUATION
Post-Op Assessment Note    CV Status:  Stable    Pain management: satisfactory to patient       Mental Status:  Alert and awake   Hydration Status:  Stable   PONV Controlled:  None   Airway Patency:  Patent     Post Op Vitals Reviewed: Yes    No anethesia notable event occurred.    Staff: NORMAN               /58 (07/03/24 0839)    Temp 98.2 °F (36.8 °C) (07/03/24 0839)    Pulse 88 (07/03/24 0839)   Resp 16 (07/03/24 0839)    SpO2 94 % (07/03/24 0839)

## 2024-07-03 NOTE — DISCHARGE SUMMARY
48-year-old male was recently hospitalized with cryptogenic stroke, mild bilateral carotid atherosclerosis as well as atherosclerosis of the vertebral artery, and a transthoracic echocardiogram showing right to left shunting who is scheduled for outpatient MARPIOSA.  MARIPOSA showed mildly hypermobile interatrial septum with no obvious PFO and no shunting on color Doppler study.  Agitated saline contrast study also did not show any shunting.  Abdominal thrust with agitated saline contrast study showed possible 1-2 bubbles crossing from the right to the left side.  Valves are all structurally normal.  No other abnormalities noted.  Patient will continue aspirin and statins.  Based on today's study, no clear indication for closure intervention.

## 2024-07-08 DIAGNOSIS — I63.9 CVA (CEREBRAL VASCULAR ACCIDENT) (HCC): ICD-10-CM

## 2024-07-08 NOTE — TELEPHONE ENCOUNTER
Medication: Rosuvastatin 40mg tablet    Dose/Frequency: 1 tablet daily    Quantity: 30 tablets    Pharmacy: [facility]    Office:   [x] PCP/Provider - Rehan Antonio  [] Speciality/Provider -     Does the patient have enough for 3 days?   [x] Yes   [] No - Send as HP to POD

## 2024-07-09 RX ORDER — ROSUVASTATIN CALCIUM 40 MG/1
40 TABLET, COATED ORAL DAILY
Qty: 90 TABLET | Refills: 3 | Status: SHIPPED | OUTPATIENT
Start: 2024-07-09

## 2024-12-26 ENCOUNTER — TELEPHONE (OUTPATIENT)
Age: 49
End: 2024-12-26

## 2024-12-26 ENCOUNTER — OFFICE VISIT (OUTPATIENT)
Dept: FAMILY MEDICINE CLINIC | Facility: CLINIC | Age: 49
End: 2024-12-26
Payer: COMMERCIAL

## 2024-12-26 VITALS
HEIGHT: 66 IN | DIASTOLIC BLOOD PRESSURE: 84 MMHG | WEIGHT: 294.4 LBS | BODY MASS INDEX: 47.31 KG/M2 | SYSTOLIC BLOOD PRESSURE: 132 MMHG | HEART RATE: 92 BPM | OXYGEN SATURATION: 98 %

## 2024-12-26 DIAGNOSIS — E66.01 OBESITY, CLASS III, BMI 40-49.9 (MORBID OBESITY) (HCC): ICD-10-CM

## 2024-12-26 DIAGNOSIS — F41.9 ANXIETY: Primary | ICD-10-CM

## 2024-12-26 DIAGNOSIS — I63.9 CVA (CEREBRAL VASCULAR ACCIDENT) (HCC): ICD-10-CM

## 2024-12-26 PROBLEM — E66.813 CLASS 3 SEVERE OBESITY DUE TO EXCESS CALORIES WITH SERIOUS COMORBIDITY AND BODY MASS INDEX (BMI) OF 45.0 TO 49.9 IN ADULT (HCC): Status: ACTIVE | Noted: 2024-12-26

## 2024-12-26 PROCEDURE — 99213 OFFICE O/P EST LOW 20 MIN: CPT | Performed by: FAMILY MEDICINE

## 2024-12-26 PROCEDURE — 99386 PREV VISIT NEW AGE 40-64: CPT | Performed by: FAMILY MEDICINE

## 2024-12-26 RX ORDER — ROSUVASTATIN CALCIUM 40 MG/1
40 TABLET, COATED ORAL DAILY
Start: 2024-12-26

## 2024-12-26 NOTE — ASSESSMENT & PLAN NOTE
Start wegovy  Orders:    Semaglutide-Weight Management (WEGOVY) 0.25 MG/0.5ML; Inject 0.5 mL (0.25 mg total) under the skin once a week

## 2024-12-26 NOTE — TELEPHONE ENCOUNTER
PA for (WEGOVY) 0.25 MG/0.5ML SUBMITTED to [employer]    via    [x]CMM-KEY: [id number]  []Surescripts-Case ID #   []Availity-Auth ID # NDC #   []Faxed to plan   []Other website   []Phone call Case ID #     []PA sent as URGENT    All office notes, labs and other pertaining documents and studies sent. Clinical questions answered. Awaiting determination from insurance company.     Turnaround time for your insurance to make a decision on your Prior Authorization can take 7-21 business days.

## 2024-12-26 NOTE — PROGRESS NOTES
"Name: Gregor Marrero      : 1975      MRN: [id number]  Encounter Provider: Robert Budinetz, MD  Encounter Date: 2024   Encounter department: [facility]  :  Assessment & Plan  CVA (cerebral vascular accident) (HCC)  Continue aspirin and statin    Orders:    rosuvastatin (CRESTOR) 40 MG tablet; Take 1 tablet (40 mg total) by mouth daily    Anxiety  Continue medical marajuana         Obesity, Class III, BMI 40-49.9 (morbid obesity) (HCC)    Start wegovy  Orders:    Semaglutide-Weight Management (WEGOVY) 0.25 MG/0.5ML; Inject 0.5 mL (0.25 mg total) under the skin once a week          Depression Screening and Follow-up Plan: Patient's depression screening was positive with a PHQ-2 score of 3. Their PHQ-9 score was 6.       History of Present Illness     The patient is here for his initial visit.  Very pleasant 49-year-old man who had a cryptogenic stroke in .  I reviewed his hospital records.  His MARIPOSA was negative for PFO.  He is on aspirin and a statin has no residual side effects he needs a form filled out for work.  He uses medical marijuana for anxiety and that is working for him.  He does vaping is uses no other drugs he drinks on weekends but not during the week.  He is trying to lose weight but is gaining weight it is very frustrating for him.  Working to try Wegovy.  He declines colon cancer screening at this time.      Review of Systems   Respiratory: Negative.     Cardiovascular: Negative.    Gastrointestinal: Negative.        Objective   /84 (BP Location: Left arm, Patient Position: Sitting, Cuff Size: Large)   Pulse 92   Ht 5' 6\" (1.676 m)   Wt 134 kg (294 lb 6.4 oz)   SpO2 98%   BMI 47.52 kg/m²      Physical Exam  Vitals and nursing note reviewed.   Constitutional:       General: He is not in acute distress.     Appearance: He is well-developed.   HENT:      Head: Normocephalic and atraumatic.   Eyes:      Conjunctiva/sclera: Conjunctivae normal. "   Cardiovascular:      Rate and Rhythm: Normal rate and regular rhythm.      Heart sounds: No murmur heard.  Pulmonary:      Effort: Pulmonary effort is normal. No respiratory distress.      Breath sounds: Normal breath sounds.   Abdominal:      Palpations: Abdomen is soft.      Tenderness: There is no abdominal tenderness.   Musculoskeletal:         General: No swelling.      Cervical back: Neck supple.   Skin:     General: Skin is warm and dry.      Capillary Refill: Capillary refill takes less than 2 seconds.   Neurological:      Mental Status: He is alert.   Psychiatric:         Mood and Affect: Mood normal.

## 2024-12-26 NOTE — ASSESSMENT & PLAN NOTE
Continue aspirin and statin    Orders:    rosuvastatin (CRESTOR) 40 MG tablet; Take 1 tablet (40 mg total) by mouth daily

## 2024-12-27 NOTE — TELEPHONE ENCOUNTER
PA for  Semaglutide-Weight Management (WEGOVY) 0.25 MG/0.5ML APPROVED     Date(s) approved 10/27/2024 - 01/10/2025    Case # [id number]    Patient advised by          []MyChart Message  [x]Phone call   []LMOM  []L/M to call office as no active Communication consent on file  []Unable to leave detailed message as VM not approved on Communication consent       Pharmacy advised by    [x]Fax  []Phone call    Approval letter scanned into [employer] Yes

## 2024-12-30 ENCOUNTER — TELEPHONE (OUTPATIENT)
Dept: ADMINISTRATIVE | Facility: OTHER | Age: 49
End: 2024-12-30

## 2024-12-30 NOTE — TELEPHONE ENCOUNTER
----- Message from Adenike GURROLA sent at 12/28/2024  2:16 AM EST -----  Regarding: Care Gap request  12/28/24 2:16 AM    Hello, our patient attached above has had Hepatitis C completed/performed. Please assist in updating the patient chart by pulling the Care Everywhere (CE) document. The date of service is 2009.     Thank you,  Adenike Douglas  [facility]

## 2024-12-31 NOTE — TELEPHONE ENCOUNTER
Upon review of the In Basket request we were able to locate, review, and update the patient chart as requested for Hepatitis C .    Any additional questions or concerns should be emailed to the Practice Liaisons via the appropriate education email address, please do not reply via In Basket.    Thank you  Vandana Godwin   [facility]

## 2024-12-31 NOTE — TELEPHONE ENCOUNTER
12/31/24 11:48 AM     Chart reviewed for Hepatitis C  was/were submitted to the patient's insurance.     Vandana Godwin   [facility]

## 2025-01-14 DIAGNOSIS — E66.01 OBESITY, CLASS III, BMI 40-49.9 (MORBID OBESITY) (HCC): ICD-10-CM

## 2025-01-22 ENCOUNTER — TELEPHONE (OUTPATIENT)
Dept: FAMILY MEDICINE CLINIC | Facility: CLINIC | Age: 50
End: 2025-01-22

## 2025-01-22 NOTE — TELEPHONE ENCOUNTER
Prior Authorization for Wegovy scanned into media please start prior authorization process    Key [id number]

## 2025-01-23 NOTE — TELEPHONE ENCOUNTER
PA for Wegovy 0.25MGSUBMITTED to [employer]    via    [x]CMM-KEY: (Key: [id number])  []Surescripts-Case ID #    []Availity-Auth ID #  NDC #    []Faxed to plan    []Other website    []Phone call Case ID #      []PA sent as URGENT    All office notes, labs and other pertaining documents and studies sent. Clinical questions answered. Awaiting determination from insurance company.     Turnaround time for your insurance to make a decision on your Prior Authorization can take 7-21 business days.

## 2025-01-24 NOTE — TELEPHONE ENCOUNTER
PA for wegovy 0.25mg  DENIED    Reason:(Screenshot if applicable)        Message sent to office clinical pool Yes    Denial letter scanned into Media Yes    Appeal started No (Provider will need to decide if appeal is warranted and send clinical documentation to Prior Authorization Team for initiation.)    **Please follow up with your patient regarding denial and next steps**

## 2025-01-27 DIAGNOSIS — E66.01 MORBID OBESITY WITH BMI OF 45.0-49.9, ADULT (HCC): Primary | ICD-10-CM

## 2025-01-27 DIAGNOSIS — E66.01 MORBID OBESITY WITH BMI OF 45.0-49.9, ADULT (HCC): ICD-10-CM

## 2025-01-27 RX ORDER — SEMAGLUTIDE 0.5 MG/.5ML
INJECTION, SOLUTION SUBCUTANEOUS
Qty: 2 ML | Refills: 0 | Status: SHIPPED | OUTPATIENT
Start: 2025-01-27

## 2025-01-27 NOTE — TELEPHONE ENCOUNTER
Pt was not aware that his Wegovy was not approved by his insurance company. The letter states the plan does not cover any anti-obesity therapies. Pt is going to contact his insurance and see if there is anything covered even a referral to Weight Mgmt.

## 2025-06-10 ENCOUNTER — VBI (OUTPATIENT)
Dept: ADMINISTRATIVE | Facility: OTHER | Age: 50
End: 2025-06-10

## 2025-06-10 NOTE — TELEPHONE ENCOUNTER
06/10/25 11:28 AM     Chart reviewed for CRC: Colonoscopy ; nothing is submitted to the patient's insurance at this time.     Rashawn Carr MA   [facility]

## 2025-07-11 DIAGNOSIS — I63.9 CVA (CEREBRAL VASCULAR ACCIDENT) (HCC): ICD-10-CM

## 2025-07-11 RX ORDER — ROSUVASTATIN CALCIUM 40 MG/1
40 TABLET, COATED ORAL DAILY
Qty: 90 TABLET | Refills: 3 | Status: SHIPPED | OUTPATIENT
Start: 2025-07-11